# Patient Record
Sex: FEMALE | Race: WHITE | NOT HISPANIC OR LATINO | Employment: OTHER | URBAN - NONMETROPOLITAN AREA
[De-identification: names, ages, dates, MRNs, and addresses within clinical notes are randomized per-mention and may not be internally consistent; named-entity substitution may affect disease eponyms.]

---

## 2022-09-08 ENCOUNTER — APPOINTMENT (EMERGENCY)
Dept: CT IMAGING | Facility: HOSPITAL | Age: 77
DRG: 440 | End: 2022-09-08
Payer: MEDICARE

## 2022-09-08 ENCOUNTER — HOSPITAL ENCOUNTER (INPATIENT)
Facility: HOSPITAL | Age: 77
LOS: 2 days | Discharge: HOME | DRG: 440 | End: 2022-09-11
Attending: EMERGENCY MEDICINE | Admitting: FAMILY MEDICINE
Payer: MEDICARE

## 2022-09-08 DIAGNOSIS — K85.90 PANCREATITIS: ICD-10-CM

## 2022-09-08 DIAGNOSIS — R10.9 ABDOMINAL PAIN: Primary | ICD-10-CM

## 2022-09-08 LAB
ALBUMIN SERPL BCP-MCNC: 3.8 G/DL (ref 3.5–5)
ALP SERPL-CCNC: 544 U/L (ref 46–116)
ALT SERPL W P-5'-P-CCNC: 263 U/L (ref 12–78)
ANION GAP SERPL CALCULATED.3IONS-SCNC: 9 MMOL/L (ref 4–13)
AST SERPL W P-5'-P-CCNC: 440 U/L (ref 5–45)
BASOPHILS # BLD AUTO: 0.04 THOUSANDS/ΜL (ref 0–0.1)
BASOPHILS NFR BLD AUTO: 1 % (ref 0–1)
BILIRUB SERPL-MCNC: 2.22 MG/DL (ref 0.2–1)
BUN SERPL-MCNC: 25 MG/DL (ref 5–25)
CALCIUM SERPL-MCNC: 9 MG/DL (ref 8.3–10.1)
CHLORIDE SERPL-SCNC: 101 MMOL/L (ref 96–108)
CO2 SERPL-SCNC: 26 MMOL/L (ref 21–32)
CREAT SERPL-MCNC: 1.31 MG/DL (ref 0.6–1.3)
EOSINOPHIL # BLD AUTO: 0.22 THOUSAND/ΜL (ref 0–0.61)
EOSINOPHIL NFR BLD AUTO: 3 % (ref 0–6)
ERYTHROCYTE [DISTWIDTH] IN BLOOD BY AUTOMATED COUNT: 13.8 % (ref 11.6–15.1)
GFR SERPL CREATININE-BSD FRML MDRD: 39 ML/MIN/1.73SQ M
GLUCOSE SERPL-MCNC: 119 MG/DL (ref 65–140)
HCT VFR BLD AUTO: 38.6 % (ref 34.8–46.1)
HGB BLD-MCNC: 12.6 G/DL (ref 11.5–15.4)
IMM GRANULOCYTES # BLD AUTO: 0.03 THOUSAND/UL (ref 0–0.2)
IMM GRANULOCYTES NFR BLD AUTO: 0 % (ref 0–2)
LIPASE SERPL-CCNC: 5360 U/L (ref 73–393)
LYMPHOCYTES # BLD AUTO: 0.65 THOUSANDS/ΜL (ref 0.6–4.47)
LYMPHOCYTES NFR BLD AUTO: 8 % (ref 14–44)
MCH RBC QN AUTO: 29.4 PG (ref 26.8–34.3)
MCHC RBC AUTO-ENTMCNC: 32.6 G/DL (ref 31.4–37.4)
MCV RBC AUTO: 90 FL (ref 82–98)
MONOCYTES # BLD AUTO: 0.59 THOUSAND/ΜL (ref 0.17–1.22)
MONOCYTES NFR BLD AUTO: 8 % (ref 4–12)
NEUTROPHILS # BLD AUTO: 6.24 THOUSANDS/ΜL (ref 1.85–7.62)
NEUTS SEG NFR BLD AUTO: 80 % (ref 43–75)
NRBC BLD AUTO-RTO: 0 /100 WBCS
PLATELET # BLD AUTO: 341 THOUSANDS/UL (ref 149–390)
PMV BLD AUTO: 10 FL (ref 8.9–12.7)
POTASSIUM SERPL-SCNC: 4.6 MMOL/L (ref 3.5–5.3)
PROT SERPL-MCNC: 7.2 G/DL (ref 6.4–8.4)
RBC # BLD AUTO: 4.29 MILLION/UL (ref 3.81–5.12)
SODIUM SERPL-SCNC: 136 MMOL/L (ref 135–147)
WBC # BLD AUTO: 7.77 THOUSAND/UL (ref 4.31–10.16)

## 2022-09-08 PROCEDURE — 85025 COMPLETE CBC W/AUTO DIFF WBC: CPT | Performed by: EMERGENCY MEDICINE

## 2022-09-08 PROCEDURE — C9113 INJ PANTOPRAZOLE SODIUM, VIA: HCPCS | Performed by: EMERGENCY MEDICINE

## 2022-09-08 PROCEDURE — 84481 FREE ASSAY (FT-3): CPT | Performed by: FAMILY MEDICINE

## 2022-09-08 PROCEDURE — 99285 EMERGENCY DEPT VISIT HI MDM: CPT | Performed by: EMERGENCY MEDICINE

## 2022-09-08 PROCEDURE — 83690 ASSAY OF LIPASE: CPT | Performed by: EMERGENCY MEDICINE

## 2022-09-08 PROCEDURE — 96375 TX/PRO/DX INJ NEW DRUG ADDON: CPT

## 2022-09-08 PROCEDURE — 96374 THER/PROPH/DIAG INJ IV PUSH: CPT

## 2022-09-08 PROCEDURE — 74177 CT ABD & PELVIS W/CONTRAST: CPT

## 2022-09-08 PROCEDURE — 99285 EMERGENCY DEPT VISIT HI MDM: CPT

## 2022-09-08 PROCEDURE — 80053 COMPREHEN METABOLIC PANEL: CPT | Performed by: EMERGENCY MEDICINE

## 2022-09-08 PROCEDURE — 36415 COLL VENOUS BLD VENIPUNCTURE: CPT | Performed by: EMERGENCY MEDICINE

## 2022-09-08 PROCEDURE — G1004 CDSM NDSC: HCPCS

## 2022-09-08 PROCEDURE — 1124F ACP DISCUSS-NO DSCNMKR DOCD: CPT | Performed by: EMERGENCY MEDICINE

## 2022-09-08 RX ORDER — ONDANSETRON 2 MG/ML
4 INJECTION INTRAMUSCULAR; INTRAVENOUS ONCE
Status: COMPLETED | OUTPATIENT
Start: 2022-09-08 | End: 2022-09-08

## 2022-09-08 RX ORDER — PANTOPRAZOLE SODIUM 40 MG/10ML
40 INJECTION, POWDER, LYOPHILIZED, FOR SOLUTION INTRAVENOUS ONCE
Status: COMPLETED | OUTPATIENT
Start: 2022-09-08 | End: 2022-09-08

## 2022-09-08 RX ADMIN — MORPHINE SULFATE 2 MG: 2 INJECTION, SOLUTION INTRAMUSCULAR; INTRAVENOUS at 21:16

## 2022-09-08 RX ADMIN — IOHEXOL 85 ML: 350 INJECTION, SOLUTION INTRAVENOUS at 22:03

## 2022-09-08 RX ADMIN — ONDANSETRON 4 MG: 2 INJECTION INTRAMUSCULAR; INTRAVENOUS at 21:12

## 2022-09-08 RX ADMIN — PANTOPRAZOLE SODIUM 40 MG: 40 INJECTION, POWDER, FOR SOLUTION INTRAVENOUS at 21:12

## 2022-09-09 ENCOUNTER — APPOINTMENT (OUTPATIENT)
Dept: MRI IMAGING | Facility: HOSPITAL | Age: 77
DRG: 440 | End: 2022-09-09
Payer: MEDICARE

## 2022-09-09 PROBLEM — K85.90 ACUTE PANCREATITIS: Status: ACTIVE | Noted: 2022-09-09

## 2022-09-09 PROBLEM — K31.6 ACQUIRED GASTRIC FISTULA: Status: ACTIVE | Noted: 2022-09-09

## 2022-09-09 PROBLEM — I10 PRIMARY HYPERTENSION: Status: ACTIVE | Noted: 2022-09-09

## 2022-09-09 PROBLEM — G89.4 CHRONIC PAIN SYNDROME: Status: ACTIVE | Noted: 2022-09-09

## 2022-09-09 PROBLEM — N18.30 CKD (CHRONIC KIDNEY DISEASE) STAGE 3, GFR 30-59 ML/MIN (HCC): Status: ACTIVE | Noted: 2022-09-09

## 2022-09-09 PROBLEM — E03.9 ACQUIRED HYPOTHYROIDISM: Status: ACTIVE | Noted: 2022-09-09

## 2022-09-09 LAB
ALBUMIN SERPL BCP-MCNC: 3.1 G/DL (ref 3.5–5)
ALP SERPL-CCNC: 559 U/L (ref 46–116)
ALT SERPL W P-5'-P-CCNC: 373 U/L (ref 12–78)
ANION GAP SERPL CALCULATED.3IONS-SCNC: 10 MMOL/L (ref 4–13)
AST SERPL W P-5'-P-CCNC: 476 U/L (ref 5–45)
BASOPHILS # BLD AUTO: 0.02 THOUSANDS/ΜL (ref 0–0.1)
BASOPHILS NFR BLD AUTO: 0 % (ref 0–1)
BILIRUB SERPL-MCNC: 3.24 MG/DL (ref 0.2–1)
BUN SERPL-MCNC: 21 MG/DL (ref 5–25)
CALCIUM ALBUM COR SERPL-MCNC: 9.2 MG/DL (ref 8.3–10.1)
CALCIUM SERPL-MCNC: 8.5 MG/DL (ref 8.3–10.1)
CHLORIDE SERPL-SCNC: 102 MMOL/L (ref 96–108)
CO2 SERPL-SCNC: 25 MMOL/L (ref 21–32)
CREAT SERPL-MCNC: 1.39 MG/DL (ref 0.6–1.3)
EOSINOPHIL # BLD AUTO: 0.07 THOUSAND/ΜL (ref 0–0.61)
EOSINOPHIL NFR BLD AUTO: 1 % (ref 0–6)
ERYTHROCYTE [DISTWIDTH] IN BLOOD BY AUTOMATED COUNT: 13.9 % (ref 11.6–15.1)
GFR SERPL CREATININE-BSD FRML MDRD: 36 ML/MIN/1.73SQ M
GLUCOSE SERPL-MCNC: 128 MG/DL (ref 65–140)
HCT VFR BLD AUTO: 33.9 % (ref 34.8–46.1)
HGB BLD-MCNC: 11.1 G/DL (ref 11.5–15.4)
IMM GRANULOCYTES # BLD AUTO: 0.02 THOUSAND/UL (ref 0–0.2)
IMM GRANULOCYTES NFR BLD AUTO: 0 % (ref 0–2)
INR PPP: 0.99 (ref 0.84–1.19)
LIPASE SERPL-CCNC: 1791 U/L (ref 73–393)
LYMPHOCYTES # BLD AUTO: 0.28 THOUSANDS/ΜL (ref 0.6–4.47)
LYMPHOCYTES NFR BLD AUTO: 4 % (ref 14–44)
MAGNESIUM SERPL-MCNC: 2.1 MG/DL (ref 1.6–2.6)
MCH RBC QN AUTO: 29.2 PG (ref 26.8–34.3)
MCHC RBC AUTO-ENTMCNC: 32.7 G/DL (ref 31.4–37.4)
MCV RBC AUTO: 89 FL (ref 82–98)
MONOCYTES # BLD AUTO: 0.6 THOUSAND/ΜL (ref 0.17–1.22)
MONOCYTES NFR BLD AUTO: 8 % (ref 4–12)
NEUTROPHILS # BLD AUTO: 6.33 THOUSANDS/ΜL (ref 1.85–7.62)
NEUTS SEG NFR BLD AUTO: 87 % (ref 43–75)
NRBC BLD AUTO-RTO: 0 /100 WBCS
PHOSPHATE SERPL-MCNC: 3.7 MG/DL (ref 2.3–4.1)
PLATELET # BLD AUTO: 280 THOUSANDS/UL (ref 149–390)
PMV BLD AUTO: 9.8 FL (ref 8.9–12.7)
POTASSIUM SERPL-SCNC: 4.1 MMOL/L (ref 3.5–5.3)
PROT SERPL-MCNC: 6.1 G/DL (ref 6.4–8.4)
PROTHROMBIN TIME: 13.2 SECONDS (ref 11.6–14.5)
RBC # BLD AUTO: 3.8 MILLION/UL (ref 3.81–5.12)
SODIUM SERPL-SCNC: 137 MMOL/L (ref 135–147)
T3FREE SERPL-MCNC: 2.66 PG/ML (ref 2.3–4.2)
T4 FREE SERPL-MCNC: 1.55 NG/DL (ref 0.76–1.46)
TRIGL SERPL-MCNC: 45 MG/DL
TSH SERPL DL<=0.05 MIU/L-ACNC: 0.3 UIU/ML (ref 0.45–4.5)
WBC # BLD AUTO: 7.32 THOUSAND/UL (ref 4.31–10.16)

## 2022-09-09 PROCEDURE — 84100 ASSAY OF PHOSPHORUS: CPT | Performed by: NURSE PRACTITIONER

## 2022-09-09 PROCEDURE — 99222 1ST HOSP IP/OBS MODERATE 55: CPT | Performed by: FAMILY MEDICINE

## 2022-09-09 PROCEDURE — NC001 PR NO CHARGE

## 2022-09-09 PROCEDURE — 84478 ASSAY OF TRIGLYCERIDES: CPT | Performed by: NURSE PRACTITIONER

## 2022-09-09 PROCEDURE — 80053 COMPREHEN METABOLIC PANEL: CPT | Performed by: NURSE PRACTITIONER

## 2022-09-09 PROCEDURE — 84439 ASSAY OF FREE THYROXINE: CPT | Performed by: NURSE PRACTITIONER

## 2022-09-09 PROCEDURE — 83690 ASSAY OF LIPASE: CPT | Performed by: NURSE PRACTITIONER

## 2022-09-09 PROCEDURE — G1004 CDSM NDSC: HCPCS

## 2022-09-09 PROCEDURE — 85610 PROTHROMBIN TIME: CPT | Performed by: NURSE PRACTITIONER

## 2022-09-09 PROCEDURE — 84443 ASSAY THYROID STIM HORMONE: CPT | Performed by: NURSE PRACTITIONER

## 2022-09-09 PROCEDURE — 83735 ASSAY OF MAGNESIUM: CPT | Performed by: NURSE PRACTITIONER

## 2022-09-09 PROCEDURE — 85025 COMPLETE CBC W/AUTO DIFF WBC: CPT | Performed by: NURSE PRACTITIONER

## 2022-09-09 PROCEDURE — 74181 MRI ABDOMEN W/O CONTRAST: CPT

## 2022-09-09 RX ORDER — LEVOTHYROXINE SODIUM 0.07 MG/1
75 TABLET ORAL DAILY
COMMUNITY

## 2022-09-09 RX ORDER — SODIUM CHLORIDE 9 MG/ML
75 INJECTION, SOLUTION INTRAVENOUS CONTINUOUS
Status: DISCONTINUED | OUTPATIENT
Start: 2022-09-09 | End: 2022-09-11

## 2022-09-09 RX ORDER — HYDROMORPHONE HCL/PF 1 MG/ML
1 SYRINGE (ML) INJECTION EVERY 4 HOURS PRN
Status: DISCONTINUED | OUTPATIENT
Start: 2022-09-09 | End: 2022-09-10

## 2022-09-09 RX ORDER — HYDROMORPHONE HCL/PF 1 MG/ML
0.5 SYRINGE (ML) INJECTION ONCE
Status: COMPLETED | OUTPATIENT
Start: 2022-09-09 | End: 2022-09-09

## 2022-09-09 RX ORDER — IBUPROFEN 200 MG
200 TABLET ORAL EVERY 6 HOURS PRN
Status: DISCONTINUED | OUTPATIENT
Start: 2022-09-09 | End: 2022-09-11 | Stop reason: HOSPADM

## 2022-09-09 RX ORDER — ESCITALOPRAM OXALATE 20 MG/1
20 TABLET ORAL DAILY
COMMUNITY

## 2022-09-09 RX ORDER — PANTOPRAZOLE SODIUM 20 MG/1
20 TABLET, DELAYED RELEASE ORAL 2 TIMES DAILY
Status: DISCONTINUED | OUTPATIENT
Start: 2022-09-09 | End: 2022-09-10

## 2022-09-09 RX ORDER — HYDROCODONE BITARTRATE AND ACETAMINOPHEN 5; 325 MG/1; MG/1
1 TABLET ORAL 2 TIMES DAILY
COMMUNITY

## 2022-09-09 RX ORDER — NICOTINE POLACRILEX 2 MG
GUM BUCCAL
COMMUNITY

## 2022-09-09 RX ORDER — ERGOCALCIFEROL (VITAMIN D2) 1250 MCG
50000 CAPSULE ORAL WEEKLY
COMMUNITY

## 2022-09-09 RX ORDER — KETOROLAC TROMETHAMINE 30 MG/ML
15 INJECTION, SOLUTION INTRAMUSCULAR; INTRAVENOUS ONCE
Status: COMPLETED | OUTPATIENT
Start: 2022-09-09 | End: 2022-09-09

## 2022-09-09 RX ORDER — HEPARIN SODIUM 5000 [USP'U]/ML
5000 INJECTION, SOLUTION INTRAVENOUS; SUBCUTANEOUS EVERY 8 HOURS SCHEDULED
Status: DISCONTINUED | OUTPATIENT
Start: 2022-09-09 | End: 2022-09-11 | Stop reason: HOSPADM

## 2022-09-09 RX ORDER — ESCITALOPRAM OXALATE 10 MG/1
20 TABLET ORAL DAILY
Status: DISCONTINUED | OUTPATIENT
Start: 2022-09-10 | End: 2022-09-11 | Stop reason: HOSPADM

## 2022-09-09 RX ORDER — LEVOTHYROXINE SODIUM 0.07 MG/1
75 TABLET ORAL DAILY
Status: DISCONTINUED | OUTPATIENT
Start: 2022-09-09 | End: 2022-09-10

## 2022-09-09 RX ORDER — LABETALOL HYDROCHLORIDE 5 MG/ML
10 INJECTION, SOLUTION INTRAVENOUS EVERY 6 HOURS PRN
Status: DISCONTINUED | OUTPATIENT
Start: 2022-09-09 | End: 2022-09-10

## 2022-09-09 RX ORDER — PANTOPRAZOLE SODIUM 40 MG/10ML
40 INJECTION, POWDER, LYOPHILIZED, FOR SOLUTION INTRAVENOUS
Status: DISCONTINUED | OUTPATIENT
Start: 2022-09-10 | End: 2022-09-11 | Stop reason: HOSPADM

## 2022-09-09 RX ORDER — HYDROMORPHONE HCL/PF 1 MG/ML
0.5 SYRINGE (ML) INJECTION
Status: DISCONTINUED | OUTPATIENT
Start: 2022-09-09 | End: 2022-09-11 | Stop reason: HOSPADM

## 2022-09-09 RX ORDER — MELATONIN
2200 WEEKLY
Status: ON HOLD | COMMUNITY
End: 2022-09-09 | Stop reason: CLARIF

## 2022-09-09 RX ORDER — PANTOPRAZOLE SODIUM 40 MG/10ML
40 INJECTION, POWDER, LYOPHILIZED, FOR SOLUTION INTRAVENOUS
Status: DISCONTINUED | OUTPATIENT
Start: 2022-09-09 | End: 2022-09-09

## 2022-09-09 RX ORDER — AMLODIPINE BESYLATE 5 MG/1
5 TABLET ORAL DAILY
Status: DISCONTINUED | OUTPATIENT
Start: 2022-09-09 | End: 2022-09-11 | Stop reason: HOSPADM

## 2022-09-09 RX ORDER — ONDANSETRON 2 MG/ML
4 INJECTION INTRAMUSCULAR; INTRAVENOUS EVERY 6 HOURS PRN
Status: DISCONTINUED | OUTPATIENT
Start: 2022-09-09 | End: 2022-09-11 | Stop reason: HOSPADM

## 2022-09-09 RX ADMIN — PIPERACILLIN AND TAZOBACTAM 2.25 G: 2; .25 INJECTION, POWDER, LYOPHILIZED, FOR SOLUTION INTRAVENOUS at 01:23

## 2022-09-09 RX ADMIN — SODIUM CHLORIDE 125 ML/HR: 0.9 INJECTION, SOLUTION INTRAVENOUS at 10:19

## 2022-09-09 RX ADMIN — SODIUM CHLORIDE 125 ML/HR: 0.9 INJECTION, SOLUTION INTRAVENOUS at 23:09

## 2022-09-09 RX ADMIN — HYDROMORPHONE HYDROCHLORIDE 0.5 MG: 1 INJECTION, SOLUTION INTRAMUSCULAR; INTRAVENOUS; SUBCUTANEOUS at 01:07

## 2022-09-09 RX ADMIN — HYDROMORPHONE HYDROCHLORIDE 0.5 MG: 1 INJECTION, SOLUTION INTRAMUSCULAR; INTRAVENOUS; SUBCUTANEOUS at 08:27

## 2022-09-09 RX ADMIN — IBUPROFEN 200 MG: 200 TABLET, FILM COATED ORAL at 11:28

## 2022-09-09 RX ADMIN — PANTOPRAZOLE SODIUM 20 MG: 20 TABLET, DELAYED RELEASE ORAL at 20:46

## 2022-09-09 RX ADMIN — PIPERACILLIN AND TAZOBACTAM 2.25 G: 2; .25 INJECTION, POWDER, LYOPHILIZED, FOR SOLUTION INTRAVENOUS at 14:54

## 2022-09-09 RX ADMIN — HYDROMORPHONE HYDROCHLORIDE 0.5 MG: 1 INJECTION, SOLUTION INTRAMUSCULAR; INTRAVENOUS; SUBCUTANEOUS at 18:58

## 2022-09-09 RX ADMIN — PIPERACILLIN AND TAZOBACTAM 2.25 G: 2; .25 INJECTION, POWDER, LYOPHILIZED, FOR SOLUTION INTRAVENOUS at 09:15

## 2022-09-09 RX ADMIN — KETOROLAC TROMETHAMINE 15 MG: 30 INJECTION, SOLUTION INTRAMUSCULAR at 23:40

## 2022-09-09 RX ADMIN — SODIUM CHLORIDE 125 ML/HR: 0.9 INJECTION, SOLUTION INTRAVENOUS at 01:22

## 2022-09-09 RX ADMIN — HEPARIN SODIUM 5000 UNITS: 5000 INJECTION INTRAVENOUS; SUBCUTANEOUS at 20:47

## 2022-09-09 RX ADMIN — HEPARIN SODIUM 5000 UNITS: 5000 INJECTION INTRAVENOUS; SUBCUTANEOUS at 14:53

## 2022-09-09 NOTE — ASSESSMENT & PLAN NOTE
· Chronic pain syndrome involving lumbar spine  · Followed by pain management Dr Marquez Robles  · Hydrocodone b i d  P r n   Home regimen- resume when tolerating oral intake  · PDMP reviewed- no red flags

## 2022-09-09 NOTE — H&P
[address] Megan Margarito  H&P- Max Reidgrmateo 1945, 65 y o  female MRN: [id number]  Unit/Bed#: -[facility] Encounter: [id number]  Primary Care Provider: Rocky Hill MD   Date and time admitted to hospital: 9/8/2022  8:47 PM    * Acute pancreatitis  Assessment & Plan  · Developed acute severe abdominal pain at 2 p m   · Transaminase elevation-remote cholecystectomy 1997   · Lipase 5360  · CT abdomen pelvis- Mild distention of intrahepatic bile ducts  CBD measures up to 16 mm  Acute pancreatitis and concern for diffuse pancreatic necrosis  · Check MRCP- rule out CBD obstruction  · Empiric Zosyn  · Check procalcitonin  · Pain control  · NPO  · IV hydration  · Appreciate general surgery consultation- aware after discussion with ED attending    Primary hypertension  Assessment & Plan  · PTA valsartan/HCTZ combo- on hold due to NPO  · P r n  Labetalol  · Trend blood pressures    CKD (chronic kidney disease) stage 3, GFR 30-59 ml/min [facility])  Assessment & Plan  Lab Results   Component Value Date    EGFR 39 09/08/2022    CREATININE 1 31 (H) 09/08/2022   · Creatinine 1 31 on admission appears baseline  · Trend creatinine  · Renally dose medications  · CT- Left renal 2 2 cm cyst   Symmetric nephrographic phase enhancement of the kidneys  No obstructive uropathy  · Outpatient follow-up    Acquired hypothyroidism  Assessment & Plan  · Update TSH with reflex T4  · Resume levothyroxine 75 mcg daily    Hx of Acquired gastric fistula  Assessment & Plan  · s/p ORYGBP with known gastric-gastric fistula diagnosed 2013  · [facility]  · Continue PPI      Chronic pain syndrome  Assessment & Plan  · Chronic pain syndrome involving lumbar spine  · Followed by pain management Dr Iris Titus  · Hydrocodone b i d  P r n   Home regimen- resume when tolerating oral intake  · PDMP reviewed- no red flags    VTE Pharmacologic Prophylaxis: VTE Score: 6 High Risk (Score >/= 5) - Pharmacological DVT Prophylaxis Ordered: heparin  Sequential Compression Devices Ordered  Code Status: Level 1 - Full Code   Discussion with family: Updated  (daughter) at bedside  Anticipated Length of Stay: Patient will be admitted on an inpatient basis with an anticipated length of stay of greater than 2 midnights secondary to pancreatitis  Total Time for Visit, including Counseling / Coordination of Care: 30 minutes Greater than 50% of this total time spent on direct patient counseling and coordination of care  Chief Complaint:  Severe abdominal pain    History of Present Illness:  Nilsa Bundy is a 65 y o  female with a PMH of gastric bypass surgery with cholecystectomy 1997, 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 maintained on PPI, hypertension, hypothyroidism, chronic pain syndrome who presents with acute onset of severe epigastric abdominal pain that radiated straight through to her back and surrounded her abdomen in a bandlike fashion earlier this afternoon  Not associated with nausea/vomiting/diarrhea  In the emergency department found to have acute pancreatitis with concern for necrotizing pancreatitis and CBD dilation  Patient was discussed by ER attending with general surgeon on-call, recommendations were made for admission to the medical service for MRCP  Review of Systems:  Review of Systems   Constitutional: Positive for chills  Negative for fever  HENT: Negative for ear pain and sore throat  Eyes: Negative for pain and visual disturbance  Respiratory: Negative for cough and shortness of breath  Cardiovascular: Negative for chest pain and palpitations  Gastrointestinal: Positive for abdominal pain  Negative for vomiting  Genitourinary: Negative for dysuria and hematuria  Musculoskeletal: Negative for arthralgias and back pain  Skin: Negative for color change and rash  Neurological: Negative for seizures and syncope  All other systems reviewed and are negative        Past Medical and Surgical History:   Past Medical History:   Diagnosis Date    Disease of thyroid gland     GERD (gastroesophageal reflux disease)     Hypertension        Past Surgical History:   Procedure Laterality Date    HYSTERECTOMY      REDUCTION MAMMAPLASTY Bilateral     KAREEM-EN-Y PROCEDURE      open bypass and jeff Dr Tanya Wilkinson         Meds/Allergies:  Prior to Admission medications    Medication Sig Start Date End Date Taking? Authorizing Provider   Biotin 1 MG CAPS Take by mouth   Yes Historical Provider, MD   cholecalciferol (VITAMIN D3) 1,000 units tablet Take 2,200 Units by mouth once a week wednesday   Yes Historical Provider, MD   escitalopram (LEXAPRO) 20 mg tablet Take 20 mg by mouth daily   Yes Historical Provider, MD   HYDROcodone-acetaminophen (NORCO) 5-325 mg per tablet Take 1 tablet by mouth 2 (two) times a day   Yes Historical Provider, MD   levothyroxine 75 mcg tablet Take 75 mcg by mouth daily Unsure of the correct dosage   Yes Historical Provider, MD   Pantoprazole Sodium (PROTONIX PO) Take 20 mg by mouth 2 (two) times a day   Yes Historical Provider, MD   valsartan 160 mg TABS 160 mg, hydrochlorothiazide 12 5 mg TABS 12 5 mg Take by mouth daily   Yes Historical Provider, MD     I have reviewed home medications with patient personally      Allergies: No Known Allergies    Social History:  Marital Status: /Civil Union   Occupation:  Retired  Patient Pre-hospital Living Situation: Home  Patient Pre-hospital Level of Mobility: walks  Patient Pre-hospital Diet Restrictions:  None  Substance Use History:   Social History     Substance and Sexual Activity   Alcohol Use Not Currently     Social History     Tobacco Use   Smoking Status Never Smoker   Smokeless Tobacco Never Used     Social History     Substance and Sexual Activity   Drug Use Never       Family History:  Family History   Problem Relation Age of Onset    Hypertension Mother     Heart disease Mother     COPD Mother  Deep vein thrombosis Mother        Physical Exam:     Vitals:   Blood Pressure: (!) 186/99 (09/09/22 0041)  Pulse: 81 (09/09/22 0041)  Temperature: 97 9 °F (36 6 °C) (09/09/22 0039)  Temp Source: Temporal (09/08/22 2051)  Respirations: 18 (09/09/22 0041)  Height: 5' 3" (160 cm) (09/08/22 2051)  Weight - Scale: 93 1 kg (205 lb 4 oz) (09/09/22 0049)  SpO2: 98 % (09/09/22 0041)    Physical Exam  Vitals and nursing note reviewed  Constitutional:       General: She is not in acute distress  Appearance: She is well-developed  She is ill-appearing  HENT:      Head: Normocephalic and atraumatic  Mouth/Throat:      Mouth: Mucous membranes are dry  Eyes:      Conjunctiva/sclera: Conjunctivae normal    Cardiovascular:      Rate and Rhythm: Normal rate and regular rhythm  Heart sounds: No murmur heard  Pulmonary:      Effort: Pulmonary effort is normal  No respiratory distress  Breath sounds: Normal breath sounds  Abdominal:      Palpations: Abdomen is soft  Tenderness: There is abdominal tenderness in the epigastric area  There is guarding  Musculoskeletal:         General: No swelling  Normal range of motion  Cervical back: Neck supple  Skin:     General: Skin is warm and dry  Capillary Refill: Capillary refill takes less than 2 seconds  Neurological:      General: No focal deficit present  Mental Status: She is alert and oriented to person, place, and time  Cranial Nerves: No cranial nerve deficit     Psychiatric:         Mood and Affect: Mood normal          Behavior: Behavior normal           Additional Data:     Lab Results:  Results from last 7 days   Lab Units 09/08/22 2112   WBC Thousand/uL 7 77   HEMOGLOBIN g/dL 12 6   HEMATOCRIT % 38 6   PLATELETS Thousands/uL 341   NEUTROS PCT % 80*   LYMPHS PCT % 8*   MONOS PCT % 8   EOS PCT % 3     Results from last 7 days   Lab Units 09/08/22 2112   SODIUM mmol/L 136   POTASSIUM mmol/L 4 6   CHLORIDE mmol/L 101   CO2 mmol/L 26   BUN mg/dL 25   CREATININE mg/dL 1 31*   ANION GAP mmol/L 9   CALCIUM mg/dL 9 0   ALBUMIN g/dL 3 8   TOTAL BILIRUBIN mg/dL 2 22*   ALK PHOS U/L 544*   ALT U/L 263*   AST U/L 440*   GLUCOSE RANDOM mg/dL 119                       Imaging: Reviewed radiology reports from this admission including: abdominal/pelvic CT  CT abdomen pelvis with contrast   Final Result by Jeffrey Drew MD (09/09 0022)         1  Findings consistent with acute pancreatitis and concern for diffuse pancreatic necrosis  2   Dilatation of intrahepatic and common bile ducts, and excess of expected postsurgical change from cholecystectomy, possibly secondary to acute pancreatitis  MRCP may be helpful for further evaluation  Workstation performed: [id number]         MRI inpatient order    (Results Pending)       EKG and Other Studies Reviewed on Admission:   · All    ** Please Note: This note has been constructed using a voice recognition system   **

## 2022-09-09 NOTE — ASSESSMENT & PLAN NOTE
Lab Results   Component Value Date    EGFR 39 09/08/2022    CREATININE 1 31 (H) 09/08/2022   · Creatinine 1 31 on admission appears baseline  · Trend creatinine  · Renally dose medications  · CT- Left renal 2 2 cm cyst   Symmetric nephrographic phase enhancement of the kidneys  No obstructive uropathy    · Outpatient follow-up

## 2022-09-09 NOTE — ED PROVIDER NOTES
History  Chief Complaint   Patient presents with    Abdominal Pain     Upper abdominal pain started approx 2pm, Reports mild nausea  Denies V/D     16-year-old female with a history of gastric bypass, hysterectomy, cholecystectomy presents emergency room with a chief complaint of abdominal pain which began around 11 30 this morning  Patient reports that she was preparing to go to lunch with friends and she took her normal narcotic pain medication, however, she took this on an empty stomach which is not her norm  Patient reports that she has a history of gastroesophageal reflux disease  She reports that the pain she is experiencing a somewhat worse  It she reports that it radiates up across her abdomen  Caused her feel slightly nauseated no vomiting  No diarrhea  No urinary complaints  Patient did tolerate p o  Intake earlier today  History provided by:  Patient  Abdominal Pain  Pain location:  Epigastric  Pain quality: sharp    Pain radiates to:  RUQ and LUQ  Pain severity:  Moderate  Onset quality:  Gradual  Timing:  Intermittent  Progression:  Waxing and waning  Chronicity:  New  Context: previous surgery    Context: not alcohol use    Relieved by:  Nothing  Worsened by:  Palpation  Ineffective treatments:  Antacids  Associated symptoms: nausea    Associated symptoms: no chest pain, no constipation, no cough, no diarrhea, no dysuria, no fatigue, no fever, no shortness of breath, no sore throat and no vomiting    Risk factors: being elderly and multiple surgeries    Risk factors: no alcohol abuse        None       Past Medical History:   Diagnosis Date    Disease of thyroid gland     GERD (gastroesophageal reflux disease)     Hypertension        Past Surgical History:   Procedure Laterality Date    GASTRECTOMY      bypass in [facility] Bilateral        History reviewed  No pertinent family history    I have reviewed and agree with the history as documented  E-Cigarette/Vaping     E-Cigarette/Vaping Substances     Social History     Tobacco Use    Smoking status: Never Smoker    Smokeless tobacco: Never Used   Substance Use Topics    Alcohol use: Not Currently    Drug use: Never       Review of Systems   Constitutional: Negative  Negative for activity change, diaphoresis, fatigue and fever  HENT: Negative  Negative for congestion, ear pain, rhinorrhea, sinus pressure and sore throat  Eyes: Negative  Respiratory: Negative  Negative for cough, choking, chest tightness, shortness of breath and wheezing  Cardiovascular: Negative  Negative for chest pain, palpitations and leg swelling  Gastrointestinal: Positive for abdominal pain and nausea  Negative for constipation, diarrhea and vomiting  Endocrine: Negative  Genitourinary: Negative  Negative for dysuria, flank pain and frequency  Musculoskeletal: Negative  Negative for arthralgias, back pain and myalgias  Skin: Negative  Negative for rash  Allergic/Immunologic: Negative  Neurological: Negative  Negative for dizziness, weakness, light-headedness and headaches  Hematological: Negative  Psychiatric/Behavioral: Negative  All other systems reviewed and are negative  Physical Exam  Physical Exam  Vitals and nursing note reviewed  Constitutional:       General: She is awake  She is not in acute distress  Appearance: Normal appearance  She is well-developed  She is obese  She is not ill-appearing or toxic-appearing  HENT:      Head: Normocephalic and atraumatic  Right Ear: External ear normal       Left Ear: External ear normal       Nose: Nose normal       Mouth/Throat:      Mouth: Mucous membranes are moist    Eyes:      General: Lids are normal  No scleral icterus  Extraocular Movements: Extraocular movements intact  Pupils: Pupils are equal, round, and reactive to light     Cardiovascular:      Rate and Rhythm: Normal rate and regular rhythm  Heart sounds: Normal heart sounds  No murmur heard  Pulmonary:      Effort: Pulmonary effort is normal  No respiratory distress  Breath sounds: Normal breath sounds  No wheezing, rhonchi or rales  Abdominal:      General: Abdomen is flat  There is no distension  Palpations: Abdomen is soft  Tenderness: There is abdominal tenderness in the epigastric area  There is no guarding or rebound  Musculoskeletal:         General: No swelling, tenderness or deformity  Normal range of motion  Cervical back: Normal range of motion and neck supple  Skin:     General: Skin is warm and dry  Coloration: Skin is not jaundiced or pale  Findings: No rash  Neurological:      Mental Status: She is alert and oriented to person, place, and time  Mental status is at baseline  Cranial Nerves: No cranial nerve deficit  Motor: No weakness     Psychiatric:         Attention and Perception: Attention normal          Mood and Affect: Mood normal          Speech: Speech normal          Behavior: Behavior normal          Vital Signs  ED Triage Vitals [09/08/22 2051]   Temperature Pulse Respirations Blood Pressure SpO2   97 6 °F (36 4 °C) 70 19 151/66 100 %      Temp Source Heart Rate Source Patient Position - Orthostatic VS BP Location FiO2 (%)   Temporal Right;Radial Lying Right arm --      Pain Score       10 - Worst Possible Pain           Vitals:    09/08/22 2130 09/08/22 2230 09/08/22 2300 09/08/22 2330   BP: 122/60 132/58 145/67 150/67   Pulse: 64 71 70 74   Patient Position - Orthostatic VS:  Lying Lying          Visual Acuity      ED Medications  Medications   ondansetron (ZOFRAN) injection 4 mg (4 mg Intravenous Given 9/8/22 2112)   pantoprazole (PROTONIX) injection 40 mg (40 mg Intravenous Given 9/8/22 2112)   morphine injection 2 mg (2 mg Intravenous Given 9/8/22 2116)   iohexol (OMNIPAQUE) 350 MG/ML injection (MULTI-DOSE) 85 mL (85 mL Intravenous Given 9/8/22 2203) Diagnostic Studies  Results Reviewed     Procedure Component Value Units Date/Time    Lipase [[id number]]  (Abnormal) Collected: 09/08/22 2112    Lab Status: Final result Specimen: Blood from Arm, Left Updated: 09/08/22 2156     Lipase 5,360 u/L     Comprehensive metabolic panel [[id number]]  (Abnormal) Collected: 09/08/22 2112    Lab Status: Final result Specimen: Blood from Arm, Left Updated: 09/08/22 2142     Sodium 136 mmol/L      Potassium 4 6 mmol/L      Chloride 101 mmol/L      CO2 26 mmol/L      ANION GAP 9 mmol/L      BUN 25 mg/dL      Creatinine 1 31 mg/dL      Glucose 119 mg/dL      Calcium 9 0 mg/dL       U/L       U/L      Alkaline Phosphatase 544 U/L      Total Protein 7 2 g/dL      Albumin 3 8 g/dL      Total Bilirubin 2 22 mg/dL      eGFR 39 ml/min/1 73sq m     Narrative:      [place] guidelines for Chronic Kidney Disease (CKD):     Stage 1 with normal or high GFR (GFR > 90 mL/min/1 73 square meters)    Stage 2 Mild CKD (GFR = 60-89 mL/min/1 73 square meters)    Stage 3A Moderate CKD (GFR = 45-59 mL/min/1 73 square meters)    Stage 3B Moderate CKD (GFR = 30-44 mL/min/1 73 square meters)    Stage 4 Severe CKD (GFR = 15-29 mL/min/1 73 square meters)    Stage 5 End Stage CKD (GFR <15 mL/min/1 73 square meters)  Note: GFR calculation is accurate only with a steady state creatinine    CBC and differential [[id number]]  (Abnormal) Collected: 09/08/22 2112    Lab Status: Final result Specimen: Blood from Arm, Left Updated: 09/08/22 2123     WBC 7 77 Thousand/uL      RBC 4 29 Million/uL      Hemoglobin 12 6 g/dL      Hematocrit 38 6 %      MCV 90 fL      MCH 29 4 pg      MCHC 32 6 g/dL      RDW 13 8 %      MPV 10 0 fL      Platelets 200 Thousands/uL      nRBC 0 /100 WBCs      Neutrophils Relative 80 %      Immat GRANS % 0 %      Lymphocytes Relative 8 %      Monocytes Relative 8 %      Eosinophils Relative 3 %      Basophils Relative 1 %      Neutrophils Absolute 6 24 Thousands/µL      Immature Grans Absolute 0 03 Thousand/uL      Lymphocytes Absolute 0 65 Thousands/µL      Monocytes Absolute 0 59 Thousand/µL      Eosinophils Absolute 0 22 Thousand/µL      Basophils Absolute 0 04 Thousands/µL                  CT abdomen pelvis with contrast    (Results Pending)              Procedures  Procedures         ED Course  ED Course as of 09/09/22 0013   Thu Sep 08, 2022   2114 Patient reporting to nursing that Protonix will not help her and she is requesting narcotic pain medication   2207 Lipase(!): 5,360   2207 AST(!): 440   2207 ALT(!): 263   2207 Alkaline Phosphatase(!): 544   2208 Four months ago liver function studies were normal    2208 Ultrasound performed in May of this year revealed:  "1  Small cystic area in the gallbladder fossa, probably mild dilation of the cystic duct remnant  Correlation with liver function tests is recommended  If there is concern for choledocholithiasis consider further evaluation with MRCP   2  No significant biliary ductal dilation or other abnormality "   8870 CT shows edematous pancreas with pancreatic fat stranding and common bile duct dilatation this is consistent with the pancreatitis  Patient may require MRCP or ERCP  Fri Sep 09, 2022   0010 MRCP can be performed at this facility  Patient may be admitted to our facility  Had previously discussed with surgery  Nothing for them to add from a clinical standpoint at this time  Admission to medicine  SBIRT 20yo+    Flowsheet Row Most Recent Value   SBIRT (23 yo +)    In order to provide better care to our patients, we are screening all of our patients for alcohol and drug use  Would it be okay to ask you these screening questions? Yes Filed at: 09/08/2022 2055   Initial Alcohol Screen: US AUDIT-C     1  How often do you have a drink containing alcohol? 0 Filed at: 09/08/2022 2055   2   How many drinks containing alcohol do you have on a typical day you are drinking? 0 Filed at: 09/08/2022 2055   3b  FEMALE Any Age, or MALE 65+: How often do you have 4 or more drinks on one occassion? 0 Filed at: 09/08/2022 2055   Audit-C Score 0 Filed at: 09/08/2022 2055   SHERRI: How many times in the past year have you    Used an illegal drug or used a prescription medication for non-medical reasons? Never Filed at: 09/08/2022 2055                    MDM  Number of Diagnoses or Management Options  Abdominal pain: new and requires workup  Pancreatitis: new and requires workup  Diagnosis management comments: Patient presented to the emergency department and a MSE was performed  The patient was evaluated and diagnosed with acute pancreatitis  This is a new issue that will require additional planned work-up and treatment in a hospitalized setting  As may have been required as part of this evaluation, clinical laboratory test, radiology imaging and medical testing (I e  EKG) were ordered as necessitated by the patient's presentation  I independently reviewed these studies, imaging and testing  This patient's case is considered to be a considerable risk secondary to the above listed disease process and poses a threat to the patient's well-being and baseline function  Further in-patient diagnostic testing and management, which may include the administration of parenteral medications, is required           Amount and/or Complexity of Data Reviewed  Clinical lab tests: reviewed and ordered  Tests in the radiology section of CPT®: reviewed and ordered  Discuss the patient with other providers: yes (Surgery and Medicine)    Risk of Complications, Morbidity, and/or Mortality  Presenting problems: high  Diagnostic procedures: moderate  Management options: moderate    Patient Progress  Patient progress: improved      Disposition  Final diagnoses:   Abdominal pain   Pancreatitis     Time reflects when diagnosis was documented in both MDM as applicable and the Disposition within this note Time User Action Codes Description Comment    9/9/2022 12:02 AM Eilleen Blades Add [R10 9] Abdominal pain     9/9/2022 12:03 AM Eilleen Blades Add [K85 90] Pancreatitis       ED Disposition     ED Disposition   Admit    Condition   Stable    Date/Time   Fri Sep 9, 2022 12:03 AM    Comment              Follow-up Information    None         Patient's Medications    No medications on file       No discharge procedures on file      PDMP Review     None          ED Provider  Electronically Signed by           Beka Campbell DO  09/09/22 0013

## 2022-09-09 NOTE — ASSESSMENT & PLAN NOTE
· s/p ORYGBP with known gastric-gastric fistula diagnosed 2013  · Followed by bariatric surgery at [facility]  · Continue PPI

## 2022-09-09 NOTE — PLAN OF CARE
Problem: PAIN - ADULT  Goal: Verbalizes/displays adequate comfort level or baseline comfort level  Description: Interventions:  - Encourage patient to monitor pain and request assistance  - Assess pain using appropriate pain scale  - Administer analgesics based on type and severity of pain and evaluate response  - Implement non-pharmacological measures as appropriate and evaluate response  - Consider cultural and social influences on pain and pain management  - Notify physician/advanced practitioner if interventions unsuccessful or patient reports new pain  Outcome: Progressing     Problem: INFECTION - ADULT  Goal: Absence or prevention of progression during hospitalization  Description: INTERVENTIONS:  - Assess and monitor for signs and symptoms of infection  - Monitor lab/diagnostic results  - Monitor all insertion sites, i e  indwelling lines, tubes, and drains  - Monitor endotracheal if appropriate and nasal secretions for changes in amount and color  - Whigham appropriate cooling/warming therapies per order  - Administer medications as ordered  - Instruct and encourage patient and family to use good hand hygiene technique  - Identify and instruct in appropriate isolation precautions for identified infection/condition  Outcome: Progressing  Goal: Absence of fever/infection during neutropenic period  Description: INTERVENTIONS:  - Monitor WBC    Outcome: Progressing

## 2022-09-09 NOTE — CONSULTS
Consultation - [facility] at [facility] 65 y o  female MRN: [id number]  Unit/Bed#: -01 Encounter: [id number]        Consults    Reason for Consult / Principal Problem:     Acute necrotizing pancreatitis  Elevated liver enzymes  Dilated common bile duct          ASSESSMENT AND PLAN:      Acute necrotizing pancreatitis  Elevated liver enzymes  Dilated common bile duct  -patient is clinically stable  -differential diagnosis includes passed stone, pancreatic lesion especially with his family history of pancreatic cancer in 2 second-degree relatives  -possible genetic component given the pancreatic cancers as well as pancreatitis in her daughter  -CT with contrast and MR with evidence of necrotizing pancreatitis without evidence of choledocholithiasis or pancreatic mass  -dilated CBD likely multifactorial including status post cholecystectomy status, chronic opioid use and advanced age  -no indication for antibiotics at this time  -hematocrit and BUN decreasing nicely with IV fluids  -advance to clear liquid diet and if tolerated may advance to low-fat diet tomorrow  -given her RYGB status performing an EUS or ERCP will be more challenging and would require laparoscopy assistance versus an EDGE procedure by advanced GI therefore would recommend repeat MRI/MRCP with and without contrast in 2 months to assess for underlying pancreas lesion  -continue to trend liver enzymes  -check hepatitis panel  -if patient clinically deteriorates or liver enzymes do not decrease appropriately or continue to rise  would recommend transfer to a facility with a have bariatric surgery and advanced GI services        ______________________________________________________________________    HPI:  Lindsay Pickle is a/an 65 y o  female seen in consultation for acute pancreatitis    Patient has significant past history for status post RYGB, status post cholecystectomy, chronic back pain on opioids, history of GB fistula, hypertension, hypothyroidism  Patient was in her usual state of health until yesterday at 12:30 p m  When she developed significant sharp epigastric pain with some mild radiation to the back  This was associated with nausea but no vomiting  Denies fevers  This has never happened to her in the past   She is status post remote RYGB and status post cholecystectomy  Denies smoking, alcohol, NSAID use  No recent travel  No new medications  No herbal supplementations  Lipase 5360  AST 40, , alk-phos 544, T bili 2 22  Liver enzymes normal from 04/2022  CT imaging with mild distention of intrahepatic bile ducts and CBD measuring up to 16 mm with mild hepatomegaly and concern for necrotizing pancreatitis  MRI/MRCP with subcentimeter hepatic cyst, CBD of 11 mm without evidence of choledocholithiasis with mild intrahepatic biliary ductal dilation and again findings compatible with necrotizing pancreatitis  Patient with family history of pancreatic cancer in maternal grandfather and maternal aunt  She states that her daughter had a bout of pancreatitis recently  No sick contacts  REVIEW OF SYSTEMS:    Review of Systems   Constitutional: Negative for fever  HENT: Negative for trouble swallowing  Respiratory: Negative for shortness of breath  Cardiovascular: Negative for chest pain  Gastrointestinal: Positive for abdominal pain, constipation and nausea  Negative for blood in stool, diarrhea and vomiting  Genitourinary: Negative for difficulty urinating  Musculoskeletal: Positive for back pain  Skin: Negative for color change  Allergic/Immunologic: Negative for immunocompromised state  Psychiatric/Behavioral: Negative for confusion           Historical Information   Past Medical History:   Diagnosis Date    Disease of thyroid gland     GERD (gastroesophageal reflux disease)     Hypertension      Past Surgical History:   Procedure Laterality Date    HYSTERECTOMY      REDUCTION MAMMAPLASTY Bilateral     KAREEM-EN-Y PROCEDURE      open bypass and jeff Dr Gale Antony History   Social History     Substance and Sexual Activity   Alcohol Use Not Currently     Social History     Substance and Sexual Activity   Drug Use Never     Social History     Tobacco Use   Smoking Status Never Smoker   Smokeless Tobacco Never Used     Family History   Problem Relation Age of Onset    Hypertension Mother     Heart disease Mother     COPD Mother     Deep vein thrombosis Mother        Meds/Allergies     Medications Prior to Admission   Medication    Biotin 1 MG CAPS    ergocalciferol (ERGOCALCIFEROL) 1 25 MG (97966 UT) capsule    escitalopram (LEXAPRO) 20 mg tablet    HYDROcodone-acetaminophen (NORCO) 5-325 mg per tablet    levothyroxine 75 mcg tablet    Pantoprazole Sodium (PROTONIX PO)    valsartan 160 mg TABS 160 mg, hydrochlorothiazide 12 5 mg TABS 12 5 mg     Current Facility-Administered Medications   Medication Dose Route Frequency    amLODIPine (NORVASC) tablet 5 mg  5 mg Oral Daily    heparin (porcine) subcutaneous injection 5,000 Units  5,000 Units Subcutaneous Q8H [address]    HYDROmorphone (DILAUDID) injection 0 5 mg  0 5 mg Intravenous Q3H PRN    HYDROmorphone (DILAUDID) injection 1 mg  1 mg Intravenous Q4H PRN    ibuprofen (MOTRIN) tablet 200 mg  200 mg Oral Q6H PRN    labetalol (NORMODYNE) injection 10 mg  10 mg Intravenous Q6H PRN    levothyroxine tablet 75 mcg  75 mcg Oral Daily    ondansetron (ZOFRAN) injection 4 mg  4 mg Intravenous Q6H PRN    pantoprazole (PROTONIX) EC tablet 20 mg  20 mg Oral BID    [START ON 9/10/2022] pantoprazole (PROTONIX) injection 40 mg  40 mg Intravenous Q24H CARIDAD    piperacillin-tazobactam (ZOSYN) 2 25 g in sodium chloride 0 9 % 50 mL IVPB  2 25 g Intravenous Q6H    sodium chloride 0 9 % infusion  125 mL/hr Intravenous Continuous       No Known Allergies        Objective     Blood pressure 126/62, pulse 86, temperature 99 3 °F (37 4 °C), resp  rate 18, height 5' 3" (1 6 m), weight 93 1 kg (205 lb 4 oz), SpO2 94 %  Body mass index is 36 36 kg/m²  Intake/Output Summary (Last 24 hours) at 9/9/2022 1520  Last data filed at 9/9/2022 1315  Gross per 24 hour   Intake --   Output 200 ml   Net -200 ml         PHYSICAL EXAM:      Physical Exam  Constitutional:       General: She is not in acute distress  Appearance: She is obese  HENT:      Head: Normocephalic  Eyes:      General: No scleral icterus  Cardiovascular:      Rate and Rhythm: Normal rate  Pulmonary:      Effort: Pulmonary effort is normal    Abdominal:      General: There is no distension  Palpations: Abdomen is soft  Tenderness: There is no abdominal tenderness  There is no guarding  Musculoskeletal:         General: No swelling  Cervical back: Neck supple  Skin:     Coloration: Skin is not jaundiced  Neurological:      Mental Status: She is oriented to person, place, and time  Psychiatric:         Mood and Affect: Mood normal              Lab Results:   I have reviewed pertinent labs: Most Recent Labs:   Lab Results   Component Value Date    WBC 7 32 09/09/2022    RBC 3 80 (L) 09/09/2022    HGB 11 1 (L) 09/09/2022    HCT 33 9 (L) 09/09/2022     09/09/2022    RDW 13 9 09/09/2022    NEUTROABS 6 33 09/09/2022    SODIUM 137 09/09/2022    K 4 1 09/09/2022     09/09/2022    CO2 25 09/09/2022    BUN 21 09/09/2022    CREATININE 1 39 (H) 09/09/2022    GLUC 128 09/09/2022    CALCIUM 8 5 09/09/2022     (H) 09/09/2022     (H) 09/09/2022    ALKPHOS 559 (H) 09/09/2022    TP 6 1 (L) 09/09/2022    ALB 3 1 (L) 09/09/2022    TBILI 3 24 (H) 09/09/2022    TRIG 45 09/09/2022    [id number] 0 301 (L) 09/09/2022    FREET4 1 55 (H) 09/09/2022          Imaging Studies: I have personally reviewed pertinent imaging studies

## 2022-09-09 NOTE — PLAN OF CARE
Problem: Potential for Falls  Goal: Patient will remain free of falls  Description: INTERVENTIONS:  - Educate patient/family on patient safety including physical limitations  - Instruct patient to call for assistance with activity   - Consult OT/PT to assist with strengthening/mobility   - Keep Call bell within reach  - Keep bed low and locked with side rails adjusted as appropriate  - Keep care items and personal belongings within reach  - Initiate and maintain comfort rounds  - Make Fall Risk Sign visible to staff  - Apply yellow socks and bracelet for high fall risk patients  - Consider moving patient to room near nurses station  Outcome: Progressing     Problem: MOBILITY - ADULT  Goal: Maintain or return to baseline ADL function  Description: INTERVENTIONS:  -  Assess patient's ability to carry out ADLs; assess patient's baseline for ADL function and identify physical deficits which impact ability to perform ADLs (bathing, care of mouth/teeth, toileting, grooming, dressing, etc )  - Assess/evaluate cause of self-care deficits   - Assess range of motion  - Assess patient's mobility; develop plan if impaired  - Assess patient's need for assistive devices and provide as appropriate  - Encourage maximum independence but intervene and supervise when necessary  - Involve family in performance of ADLs  - Assess for home care needs following discharge   - Consider OT consult to assist with ADL evaluation and planning for discharge  - Provide patient education as appropriate  Outcome: Progressing  Goal: Maintains/Returns to pre admission functional level  Description: INTERVENTIONS:  - Perform BMAT or MOVE assessment daily    - Set and communicate daily mobility goal to care team and patient/family/caregiver     - Collaborate with rehabilitation services on mobility goals if consulted  - Out of bed for toileting  - Record patient progress and toleration of activity level   Outcome: Progressing     Problem: PAIN - ADULT  Goal: Verbalizes/displays adequate comfort level or baseline comfort level  Description: Interventions:  - Encourage patient to monitor pain and request assistance  - Assess pain using appropriate pain scale  - Administer analgesics based on type and severity of pain and evaluate response  - Implement non-pharmacological measures as appropriate and evaluate response  - Consider cultural and social influences on pain and pain management  - Notify physician/advanced practitioner if interventions unsuccessful or patient reports new pain  Outcome: Progressing     Problem: INFECTION - ADULT  Goal: Absence or prevention of progression during hospitalization  Description: INTERVENTIONS:  - Assess and monitor for signs and symptoms of infection  - Monitor lab/diagnostic results  - Monitor all insertion sites, i e  indwelling lines, tubes, and drains  - Monitor endotracheal if appropriate and nasal secretions for changes in amount and color  - [place] appropriate cooling/warming therapies per order  - Administer medications as ordered  - Instruct and encourage patient and family to use good hand hygiene technique  - Identify and instruct in appropriate isolation precautions for identified infection/condition  Outcome: Progressing  Goal: Absence of fever/infection during neutropenic period  Description: INTERVENTIONS:  - Monitor WBC    Outcome: Progressing     Problem: SAFETY ADULT  Goal: Patient will remain free of falls  Description: INTERVENTIONS:  - Educate patient/family on patient safety including physical limitations  - Instruct patient to call for assistance with activity   - Consult OT/PT to assist with strengthening/mobility   - Keep Call bell within reach  - Keep bed low and locked with side rails adjusted as appropriate  - Keep care items and personal belongings within reach  - Initiate and maintain comfort rounds  - Make Fall Risk Sign visible to staff  - Apply yellow socks and bracelet for high fall risk patients  - Consider moving patient to room near nurses station  Outcome: Progressing  Goal: Maintain or return to baseline ADL function  Description: INTERVENTIONS:  -  Assess patient's ability to carry out ADLs; assess patient's baseline for ADL function and identify physical deficits which impact ability to perform ADLs (bathing, care of mouth/teeth, toileting, grooming, dressing, etc )  - Assess/evaluate cause of self-care deficits   - Assess range of motion  - Assess patient's mobility; develop plan if impaired  - Assess patient's need for assistive devices and provide as appropriate  - Encourage maximum independence but intervene and supervise when necessary  - Involve family in performance of ADLs  - Assess for home care needs following discharge   - Consider OT consult to assist with ADL evaluation and planning for discharge  - Provide patient education as appropriate  Outcome: Progressing  Goal: Maintains/Returns to pre admission functional level  Description: INTERVENTIONS:  - Perform BMAT or MOVE assessment daily    - Set and communicate daily mobility goal to care team and patient/family/caregiver     - Collaborate with rehabilitation services on mobility goals if consulted  - Out of bed for toileting  - Record patient progress and toleration of activity level   Outcome: Progressing     Problem: DISCHARGE PLANNING  Goal: Discharge to home or other facility with appropriate resources  Description: INTERVENTIONS:  - Identify barriers to discharge w/patient and caregiver  - Arrange for needed discharge resources and transportation as appropriate  - Identify discharge learning needs (meds, wound care, etc )  - Arrange for interpretive services to assist at discharge as needed  - Refer to Case Management Department for coordinating discharge planning if the patient needs post-hospital services based on physician/advanced practitioner order or complex needs related to functional status, cognitive ability, or social support system  Outcome: Progressing     Problem: Knowledge Deficit  Goal: Patient/family/caregiver demonstrates understanding of disease process, treatment plan, medications, and discharge instructions  Description: Complete learning assessment and assess knowledge base  Interventions:  - Provide teaching at level of understanding  - Provide teaching via preferred learning methods  Outcome: Progressing     Problem: Nutrition/Hydration-ADULT  Goal: Nutrient/Hydration intake appropriate for improving, restoring or maintaining nutritional needs  Description: Monitor and assess patient's nutrition/hydration status for malnutrition  Collaborate with interdisciplinary team and initiate plan and interventions as ordered  Monitor patient's weight and dietary intake as ordered or per policy  Utilize nutrition screening tool and intervene as necessary  Determine patient's food preferences and provide high-protein, high-caloric foods as appropriate       INTERVENTIONS:  - Monitor oral intake, urinary output, labs, and treatment plans  - Assess nutrition and hydration status and recommend course of action  - Evaluate amount of meals eaten  - Assist patient with eating if necessary   - Allow adequate time for meals  - Recommend/ encourage appropriate diets, oral nutritional supplements, and vitamin/mineral supplements  - Order, calculate, and assess calorie counts as needed  - Recommend, monitor, and adjust tube feedings and TPN/PPN based on assessed needs  - Assess need for intravenous fluids  - Provide specific nutrition/hydration education as appropriate  - Include patient/family/caregiver in decisions related to nutrition  Outcome: Progressing     Problem: METABOLIC, FLUID AND ELECTROLYTES - ADULT  Goal: Electrolytes maintained within normal limits  Description: INTERVENTIONS:  - Monitor labs and assess patient for signs and symptoms of electrolyte imbalances  - Administer electrolyte replacement as ordered  - Monitor response to electrolyte replacements, including repeat lab results as appropriate  - Instruct patient on fluid and nutrition as appropriate  Outcome: Progressing  Goal: Fluid balance maintained  Description: INTERVENTIONS:  - Monitor labs   - Monitor I/O and WT  - Instruct patient on fluid and nutrition as appropriate  - Assess for signs & symptoms of volume excess or deficit  Outcome: Progressing  Goal: Glucose maintained within target range  Description: INTERVENTIONS:  - Monitor Blood Glucose as ordered  - Assess for signs and symptoms of hyperglycemia and hypoglycemia  - Administer ordered medications to maintain glucose within target range  - Assess nutritional intake and initiate nutrition service referral as needed  Outcome: Progressing

## 2022-09-09 NOTE — CONSULTS
Consultation - General Surgery   Benny Luther 65 y o  female MRN: [id number]  Unit/Bed#: -01 Encounter: [id number]    Assessment:  65 y o  female w/ acute pancreatitis    MRCP IMPRESSION:  - Diffuse loss of normal MR signal of the pancreas without significant peripancreatic inflammation is 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) in this patient with elevated lipase  - Of note, the absence of intravenous contrast limits assessment for necrosis, though the diagnosis is supported by the CT (contrast-enhanced) findings from one day prior  There are no acute necrotic collections  Biliary ductal dilatation can be attributable to the acute pancreatitis; no choledocholithiasis  CTAP IMPRESSION:  1  Findings consistent with acute pancreatitis and concern for diffuse pancreatic necrosis  2   Dilatation of intrahepatic and common bile ducts, and excess of expected postsurgical change from cholecystectomy, possibly secondary to acute pancreatitis    MRCP may be helpful for further evaluation     - Tmax 99 3, episodes of HTN overnight, otherwise VSS on room air  - WBC 7 32 (7 77)  - Hgb stable 11 1 (12 6)  - Lipase 1791 (5360)  - Transaminitis -  (440),  (263), Alk Phos 559 (544), T bili 3 24 (3 33)  - CKD - Elevated Cr - 1 39 (1 31)  - HTN, hypothyroid, chronic pain syndrome - Hydrocodone BID PRN at home     Plan:  - No acute surgical intervention planned at this facility  - Bowel rest - NPO - sips of clears, IVF   - Monitor vitals  - Trend labs - lipase, WBC  - Serial exams  - Appreciate GI recs  - Pain/nausea control PRN  - If patient were to develop more significant indication of infection w/ pancreatic necrosis would need tertiary level of care  - Co-morbidities per primary service  _______________________________________________________________  Physician Requesting Consult: Holli Guzman MD    Additional consultants: N/A    Reason for Consult / Principal Problem: Pancreatitis    HPI: Lea Odonnell is a 65y o  year old female with past surgical history significant for gastric bypass, cholecystectomy in 1997, and hysterectomy with past medical history significant for GERD, HTN, and 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 who presented to [address] [place] [address] ED 9/8 with upper abdominal pain  She explains the pain began yesterday before lunch and initially she questioned if she was experiencing GERD like symptoms after taking narcotic pain medication on an empty stomach, but the pain became worse when she was eating lunch leading her to the ED  In ED CT was significant for findings above and surgery team was contacted  Patient was made a medical admission with MRCP recommended  At time of exam patient explains that she has been tolerating sips of clears and she has had some relief in her pain  At time of extreme pain she did have episodes of nausea without vomiting, had some shakes/chills, and constant pain in a band-like fashion in the upper abdomen  Patient denies shortness of breath, chest tightness, issues with urination, or lower abdominal pain  She does admit to some constipation at times but states this has been chronic (likely second to chronic pain medication) and last BM was yesterday      Historical Information   Past Medical History:   Diagnosis Date    Disease of thyroid gland     GERD (gastroesophageal reflux disease)     Hypertension      Past Surgical History:   Procedure Laterality Date    HYSTERECTOMY      REDUCTION MAMMAPLASTY Bilateral     KAREEM-EN-Y PROCEDURE      open bypass and jeff Dr [facility] History   Social History     Substance and Sexual Activity   Alcohol Use Not Currently     Social History     Substance and Sexual Activity   Drug Use Never     Social History     Tobacco Use   Smoking Status Never Smoker   Smokeless Tobacco Never Used     Family History: non-contributory    Meds/Allergies   Home meds:   Prior to Admission medications Medication Sig Start Date End Date Taking?  Authorizing Provider   Biotin 1 MG CAPS Take by mouth   Yes Historical Provider, MD   ergocalciferol (ERGOCALCIFEROL) 1 25 MG (13876 UT) capsule Take 50,000 Units by mouth once a week   Yes Historical Provider, MD   escitalopram (LEXAPRO) 20 mg tablet Take 20 mg by mouth daily   Yes Historical Provider, MD   HYDROcodone-acetaminophen (NORCO) 5-325 mg per tablet Take 1 tablet by mouth 2 (two) times a day   Yes Historical Provider, MD   levothyroxine 75 mcg tablet Take 75 mcg by mouth daily Unsure of the correct dosage   Yes Historical Provider, MD   Pantoprazole Sodium (PROTONIX PO) Take 20 mg by mouth 2 (two) times a day   Yes Historical Provider, MD   valsartan 160 mg TABS 160 mg, hydrochlorothiazide 12 5 mg TABS 12 5 mg Take by mouth daily   Yes Historical Provider, MD   cholecalciferol (VITAMIN D3) 1,000 units tablet Take 2,200 Units by mouth once a week wednesday 9/9/22 Yes Historical Provider, MD     Scheduled Meds:  Current Facility-Administered Medications   Medication Dose Route Frequency Provider Last Rate    heparin (porcine)  5,000 Units Subcutaneous Q8H [facility] Winter S Mark, CRNP      HYDROmorphone  0 5 mg Intravenous Q3H PRN Winter S Mark, CRNP      HYDROmorphone  1 mg Intravenous Q4H PRN Winter S Mark, CRNP      labetalol  10 mg Intravenous Q6H PRN Winter S Mark, CRNP      ondansetron  4 mg Intravenous Q6H PRN Winter S Mark, CRNP      [START ON 9/10/2022] pantoprazole  40 mg Intravenous Q24H [facility] Winter S Mark, CRNP      piperacillin-tazobactam  2 25 g Intravenous Q6H Winter S Mark, CRNP 2 25 g (09/09/22 0915)    sodium chloride  125 mL/hr Intravenous Continuous Winter S Mark, CRNP 125 mL/hr (09/09/22 0122)     Continuous Infusions:sodium chloride, 125 mL/hr, Last Rate: 125 mL/hr (09/09/22 0122)    PRN Meds:    HYDROmorphone    HYDROmorphone    labetalol    ondansetron    ALLERGIES: No Known Allergies    Review of Systems:  General: positive for  - chills  negative for - fatigue or fever  Cardiovascular: no chest pain or dyspnea on exertion  Respiratory: no cough, shortness of breath, or wheezing  Gastrointestinal: positive for - abdominal pain, constipation and nausea  negative for - diarrhea, gas/bloating or vomiting  Genitourinary: no dysuria, trouble voiding, or hematuria  Musculoskeletal: negative for - joint stiffness or muscular weakness  Neurological: positive for - headaches  negative for - confusion or dizziness  Hematological and Lymphatic: negative  Dermatological : negative  Psychological: negative    Objective   Vitals:  Blood pressure 126/62, pulse 86, temperature 99 3 °F (37 4 °C), resp  rate 18, height 5' 3" (1 6 m), weight 93 1 kg (205 lb 4 oz), SpO2 94 %  Body mass index is 36 36 kg/m²  SpO2: SpO2: 94 %    I/Os:  I/O       09/07 0701 09/08 0700 09/08 0701 09/09 0700 09/09 0701  09/10 0700           Unmeasured Urine Occurrence  1 x         Invasive Lines/Tubes:  Invasive Devices  Report    Peripheral Intravenous Line  Duration           Peripheral IV 09/08/22 Left Hand <1 day              Physical Exam  Constitutional:       General: She is not in acute distress  Appearance: She is not ill-appearing  Comments: Comfortable appearing  HENT:      Head: Normocephalic and atraumatic  Nose: Nose normal       Mouth/Throat:      Mouth: Mucous membranes are moist       Pharynx: Oropharynx is clear  Eyes:      Extraocular Movements: Extraocular movements intact  Pupils: Pupils are equal, round, and reactive to light  Cardiovascular:      Rate and Rhythm: Normal rate  Pulmonary:      Effort: Pulmonary effort is normal  No respiratory distress  Breath sounds: No wheezing  Abdominal:      General: Abdomen is flat  There is no distension  Palpations: Abdomen is soft  Tenderness: There is abdominal tenderness in the epigastric area and left upper quadrant  There is no guarding or rebound     Musculoskeletal: General: No swelling or deformity  Skin:     General: Skin is warm and dry  Capillary Refill: Capillary refill takes less than 2 seconds  Neurological:      General: No focal deficit present  Mental Status: She is alert  Mental status is at baseline  Psychiatric:         Mood and Affect: Mood normal          Behavior: Behavior normal      Lab Results and Cultures:   COVID:   Last COVID19 Screening Values     None         CBC:   Results from last 7 days   Lab Units 09/09/22  0622 09/08/22  2112   WBC Thousand/uL 7 32 7 77   HEMOGLOBIN g/dL 11 1* 12 6   HEMATOCRIT % 33 9* 38 6   PLATELETS Thousands/uL 280 341     BMP/CMP:  Results from last 7 days   Lab Units 09/09/22  0622 09/08/22  2112   POTASSIUM mmol/L 4 1 4 6   CHLORIDE mmol/L 102 101   CO2 mmol/L 25 26   BUN mg/dL 21 25   CREATININE mg/dL 1 39* 1 31*   CALCIUM mg/dL 8 5 9 0     Coags:   Results from last 7 days   Lab Units 09/09/22  0622   INR  0 99     Lipid panel:   Results from last 7 days   Lab Units 09/09/22 0622   TRIGLYCERIDES mg/dL 45     HgbA1c: No results found for: HGBA1C    Urinalysis: No results found for: COLORU, CLARITYU, SPECGRAV, PHUR, LEUKOCYTESUR, NITRITE, PROTEINUA, GLUCOSEU, KETONESU, BILIRUBINUR, BLOODU,   Urine Culture: No results found for: URINECX  Wound Culure: No results found for: WOUNDCULT  Blood Culture: No results found for: BLOODCX    Imaging Studies: I have personally reviewed pertinent reports  EKG, Pathology, and Other Studies: I have personally reviewed pertinent reports  VTE Prophylaxis: Heparin     Code Status: Level 1 - Full Code  Advance Directive and Living Will:      Power of :    POLST:      Counseling / Coordination of Care   Counseling/Coordination of Care: Total floor / unit time spent today 40 minutes  Greater than 50% of total time was spent with the patient and / or family counseling and / or coordination of care   A description of the counseling / coordination of care: review of chart, labs, history, discussion with patient/family at bedside to include history, physical, assessment, and plan        [place], [place]  9/9/2022

## 2022-09-09 NOTE — ASSESSMENT & PLAN NOTE
· Developed acute severe abdominal pain at 2 p m   · Transaminase elevation-remote cholecystectomy 1997   · Lipase 5360  · CT abdomen pelvis- Mild distention of intrahepatic bile ducts  CBD measures up to 16 mm   Acute pancreatitis and concern for diffuse pancreatic necrosis  · Check MRCP- rule out CBD obstruction  · Empiric Zosyn  · Check procalcitonin  · Pain control  · NPO  · IV hydration  · Appreciate general surgery consultation- aware after discussion with ED attending

## 2022-09-10 LAB
ALBUMIN SERPL BCP-MCNC: 2.6 G/DL (ref 3.5–5)
ALP SERPL-CCNC: 531 U/L (ref 46–116)
ALT SERPL W P-5'-P-CCNC: 263 U/L (ref 12–78)
ANION GAP SERPL CALCULATED.3IONS-SCNC: 12 MMOL/L (ref 4–13)
AST SERPL W P-5'-P-CCNC: 180 U/L (ref 5–45)
BILIRUB SERPL-MCNC: 4.27 MG/DL (ref 0.2–1)
BUN SERPL-MCNC: 21 MG/DL (ref 5–25)
CALCIUM ALBUM COR SERPL-MCNC: 9 MG/DL (ref 8.3–10.1)
CALCIUM SERPL-MCNC: 7.9 MG/DL (ref 8.3–10.1)
CHLORIDE SERPL-SCNC: 104 MMOL/L (ref 96–108)
CO2 SERPL-SCNC: 21 MMOL/L (ref 21–32)
CREAT SERPL-MCNC: 1.41 MG/DL (ref 0.6–1.3)
ERYTHROCYTE [DISTWIDTH] IN BLOOD BY AUTOMATED COUNT: 14.1 % (ref 11.6–15.1)
GFR SERPL CREATININE-BSD FRML MDRD: 35 ML/MIN/1.73SQ M
GLUCOSE SERPL-MCNC: 79 MG/DL (ref 65–140)
HAV IGM SER QL: NORMAL
HBV CORE IGM SER QL: NORMAL
HBV SURFACE AG SER QL: NORMAL
HCT VFR BLD AUTO: 32 % (ref 34.8–46.1)
HCV AB SER QL: NORMAL
HGB BLD-MCNC: 10.1 G/DL (ref 11.5–15.4)
LIPASE SERPL-CCNC: 120 U/L (ref 73–393)
MCH RBC QN AUTO: 28.7 PG (ref 26.8–34.3)
MCHC RBC AUTO-ENTMCNC: 31.6 G/DL (ref 31.4–37.4)
MCV RBC AUTO: 91 FL (ref 82–98)
PLATELET # BLD AUTO: 214 THOUSANDS/UL (ref 149–390)
PMV BLD AUTO: 10.1 FL (ref 8.9–12.7)
POTASSIUM SERPL-SCNC: 3.9 MMOL/L (ref 3.5–5.3)
PROCALCITONIN SERPL-MCNC: 0.35 NG/ML
PROT SERPL-MCNC: 5.4 G/DL (ref 6.4–8.4)
RBC # BLD AUTO: 3.52 MILLION/UL (ref 3.81–5.12)
SODIUM SERPL-SCNC: 137 MMOL/L (ref 135–147)
WBC # BLD AUTO: 4.67 THOUSAND/UL (ref 4.31–10.16)

## 2022-09-10 PROCEDURE — C9113 INJ PANTOPRAZOLE SODIUM, VIA: HCPCS | Performed by: NURSE PRACTITIONER

## 2022-09-10 PROCEDURE — 99232 SBSQ HOSP IP/OBS MODERATE 35: CPT | Performed by: FAMILY MEDICINE

## 2022-09-10 PROCEDURE — 84145 PROCALCITONIN (PCT): CPT | Performed by: NURSE PRACTITIONER

## 2022-09-10 PROCEDURE — 80074 ACUTE HEPATITIS PANEL: CPT | Performed by: INTERNAL MEDICINE

## 2022-09-10 PROCEDURE — 85027 COMPLETE CBC AUTOMATED: CPT | Performed by: FAMILY MEDICINE

## 2022-09-10 PROCEDURE — 80053 COMPREHEN METABOLIC PANEL: CPT | Performed by: FAMILY MEDICINE

## 2022-09-10 PROCEDURE — 83690 ASSAY OF LIPASE: CPT | Performed by: FAMILY MEDICINE

## 2022-09-10 RX ORDER — OXYCODONE HYDROCHLORIDE 5 MG/1
5 TABLET ORAL EVERY 6 HOURS PRN
Status: DISCONTINUED | OUTPATIENT
Start: 2022-09-10 | End: 2022-09-11 | Stop reason: HOSPADM

## 2022-09-10 RX ORDER — LEVOTHYROXINE SODIUM 0.07 MG/1
75 TABLET ORAL
Status: DISCONTINUED | OUTPATIENT
Start: 2022-09-10 | End: 2022-09-11 | Stop reason: HOSPADM

## 2022-09-10 RX ADMIN — HEPARIN SODIUM 5000 UNITS: 5000 INJECTION INTRAVENOUS; SUBCUTANEOUS at 21:35

## 2022-09-10 RX ADMIN — SODIUM CHLORIDE 75 ML/HR: 0.9 INJECTION, SOLUTION INTRAVENOUS at 12:40

## 2022-09-10 RX ADMIN — HEPARIN SODIUM 5000 UNITS: 5000 INJECTION INTRAVENOUS; SUBCUTANEOUS at 05:09

## 2022-09-10 RX ADMIN — PANTOPRAZOLE SODIUM 40 MG: 40 INJECTION, POWDER, FOR SOLUTION INTRAVENOUS at 08:42

## 2022-09-10 RX ADMIN — ESCITALOPRAM OXALATE 20 MG: 10 TABLET ORAL at 08:08

## 2022-09-10 RX ADMIN — OXYCODONE HYDROCHLORIDE 5 MG: 5 TABLET ORAL at 10:06

## 2022-09-10 RX ADMIN — SODIUM CHLORIDE 125 ML/HR: 0.9 INJECTION, SOLUTION INTRAVENOUS at 05:11

## 2022-09-10 RX ADMIN — HEPARIN SODIUM 5000 UNITS: 5000 INJECTION INTRAVENOUS; SUBCUTANEOUS at 14:59

## 2022-09-10 NOTE — ASSESSMENT & PLAN NOTE
· s/p ORYGBP with known gastric-gastric fistula diagnosed 2013  · Followed by bariatric surgery at [facility]  · Continue PPI

## 2022-09-10 NOTE — NURSING NOTE
Pt having  bring in home synthroid and valsartan and refusing ordered medications at this time  Dr Johanna Armendariz made aware

## 2022-09-10 NOTE — ASSESSMENT & PLAN NOTE
· TSH and free T4 level abnormal however okay to continue home regimen of levothyroxine  · Resume levothyroxine 75 mcg daily

## 2022-09-10 NOTE — PROGRESS NOTES
[address]  Progress Note - Jonathon Sarah 1945, 65 y o  female MRN: [id number]  Unit/Bed#: -01 Encounter: [id number]  Primary Care Provider: Regulo Busby MD   Date and time admitted to hospital: 9/8/2022  8:47 PM    * Acute pancreatitis  Assessment & Plan  · History of mild bouts of abdominal pain at home however this time she had severe pain and came to the ED  · Transaminase and lipase elevation-remote cholecystectomy 1997   · Lipase 5360  · No history of alcohol abuse drug abuse medications that may cause pancreatitis, normal triglyceride level  Acute hepatitis panel pending  Unclear etiology of acute pancreatitis  · CT abdomen pelvis- Mild distention of intrahepatic bile ducts  CBD measures up to 16 mm  Acute pancreatitis and concern for diffuse pancreatic necrosis  MRCP shows Diffuse loss of normal MR signal of the pancreas without significant peripancreatic inflammation is 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) in this patient with elevated lipase  Of note, the absence of intravenous contrast limits assessment for necrosis, though the diagnosis is supported by the CT (contrast-enhanced) findings from one day prior  There are no acute necrotic collections  Biliary ductal dilatation can be attributable to the acute pancreatitis; no choledocholithiasis  Clinically improving pain is improving  Will place on a clear liquid diet and observe for now  Transaminitis and lipase level is improving however bilirubin is gradually trending up  Monitor for now and probably will require EUS outpatient    Patient has 2 first-degree relatives with diet of pancreatic cancer and her daughter was recently diagnosed with acute pancreatitis in her 62s with no obvious etiology as well    Hx of Acquired gastric fistula  Assessment & Plan  · s/p ORYGBP with known gastric-gastric fistula diagnosed 2013  · Followed by bariatric surgery at [facility]  · Continue PPI      Chronic pain syndrome  Assessment & Plan  · Chronic pain syndrome involving lumbar spine  · Followed by pain management Dr Elizabeth Espinoza  · Hydrocodone b i d  P r n  Home regime  · PDMP reviewed- no red flags    Acquired hypothyroidism  Assessment & Plan  · TSH and free T4 level abnormal however okay to continue home regimen of levothyroxine  · Resume levothyroxine 75 mcg daily    CKD (chronic kidney disease) stage 3, GFR 30-59 ml/min [facility])  Assessment & Plan  Lab Results   Component Value Date    EGFR 35 09/10/2022    EGFR 36 09/09/2022    EGFR 39 09/08/2022    CREATININE 1 41 (H) 09/10/2022    CREATININE 1 39 (H) 09/09/2022    CREATININE 1 31 (H) 09/08/2022   · Creatinine 1 31 on admission appears baseline  · Trend creatinine  Renally dose medications  · CT- Left renal 2 2 cm cyst   Symmetric nephrographic phase enhancement of the kidneys  No obstructive uropathy  Primary hypertension  Assessment & Plan  · PTA valsartan/HCTZ  Currently on hold  Will place on Norvasc  Currently blood pressures are controlled      VTE Pharmacologic Prophylaxis:   Pharmacologic: Heparin  Mechanical VTE Prophylaxis in Place: Yes    Patient Centered Rounds: I have performed bedside rounds with nursing staff today  Discussions with Specialists or Other Care Team Provider:  Discussed with GI yesterday    Education and Discussions with Family / Patient:  Discussed with patient at bedside and update     Time Spent for Care: 30 minutes  More than 50% of total time spent on counseling and coordination of care as described above      Current Length of Stay: 1 day(s)    Current Patient Status: Inpatient   Certification Statement: The patient will continue to require additional inpatient hospital stay due to Acute pancreatitis    Discharge Plan:  Anticipate discharge in 48 hours    Code Status: Level 1 - Full Code      Subjective:   Patient denies any chest pain or shortness of breath complains of mild abdominal pain and back pain and requesting her pain medications to be restarted    Objective:     Vitals:   Temp (24hrs), Av 4 °F (36 9 °C), Min:97 9 °F (36 6 °C), Max:98 8 °F (37 1 °C)    Temp:  [97 9 °F (36 6 °C)-98 8 °F (37 1 °C)] 98 4 °F (36 9 °C)  HR:  [60-71] 71  Resp:  [17-18] 18  BP: (106-131)/(50-72) 128/72  SpO2:  [94 %-96 %] 96 %  Body mass index is 36 36 kg/m²  Input and Output Summary (last 24 hours): Intake/Output Summary (Last 24 hours) at 9/10/2022 1239  Last data filed at 9/10/2022 1239  Gross per 24 hour   Intake 4650 42 ml   Output 200 ml   Net 4450 42 ml       Physical Exam:     Physical Exam  Vitals and nursing note reviewed  Constitutional:       Appearance: Normal appearance  HENT:      Head: Normocephalic and atraumatic  Right Ear: External ear normal       Left Ear: External ear normal       Nose: Nose normal       Mouth/Throat:      Pharynx: Oropharynx is clear  Cardiovascular:      Rate and Rhythm: Normal rate and regular rhythm  Heart sounds: Normal heart sounds  Pulmonary:      Effort: Pulmonary effort is normal       Breath sounds: Normal breath sounds  Abdominal:      General: Bowel sounds are normal       Palpations: Abdomen is soft  Tenderness: There is abdominal tenderness  Musculoskeletal:         General: Normal range of motion  Cervical back: Normal range of motion and neck supple  Skin:     General: Skin is warm and dry  Capillary Refill: Capillary refill takes less than 2 seconds  Neurological:      General: No focal deficit present  Mental Status: She is alert and oriented to person, place, and time     Psychiatric:         Mood and Affect: Mood normal            Additional Data:     Labs:    Results from last 7 days   Lab Units 09/10/22  0500 22  0622   WBC Thousand/uL 4 67 7 32   HEMOGLOBIN g/dL 10 1* 11 1*   HEMATOCRIT % 32 0* 33 9*   PLATELETS Thousands/uL 214 280   NEUTROS PCT %  --  87*   LYMPHS PCT %  -- 4*   MONOS PCT %  --  8   EOS PCT %  --  1     Results from last 7 days   Lab Units 09/10/22  0500   SODIUM mmol/L 137   POTASSIUM mmol/L 3 9   CHLORIDE mmol/L 104   CO2 mmol/L 21   BUN mg/dL 21   CREATININE mg/dL 1 41*   ANION GAP mmol/L 12   CALCIUM mg/dL 7 9*   ALBUMIN g/dL 2 6*   TOTAL BILIRUBIN mg/dL 4 27*   ALK PHOS U/L 531*   ALT U/L 263*   AST U/L 180*   GLUCOSE RANDOM mg/dL 79     Results from last 7 days   Lab Units 09/09/22  0622   INR  0 99             Results from last 7 days   Lab Units 09/10/22  0500   PROCALCITONIN ng/ml 0 35*           * I Have Reviewed All Lab Data Listed Above  * Additional Pertinent Lab Tests Reviewed: Franky 65 Admission Reviewed    Imaging:    Imaging Reports Reviewed Today Include:  MRCP  Imaging Personally Reviewed by Myself Includes:  None    Recent Cultures (last 7 days):           Last 24 Hours Medication List:   Current Facility-Administered Medications   Medication Dose Route Frequency Provider Last Rate    amLODIPine  5 mg Oral Daily Joey Richter MD      escitalopram  20 mg Oral Daily Winter S Mark, CRNP      heparin (porcine)  5,000 Units Subcutaneous Q8H [address] Winter S Mark, CRNP      HYDROmorphone  0 5 mg Intravenous Q3H PRN Winter S Mark, CRNP      ibuprofen  200 mg Oral Q6H PRN Joey Richter MD      labetalol  10 mg Intravenous Q6H PRN Winter S Mark, CRNP      levothyroxine  75 mcg Oral Daily Joey Richter MD      ondansetron  4 mg Intravenous Q6H PRN Winter S Mark, CRNP      oxyCODONE  5 mg Oral Q6H PRN Joey Richter MD      pantoprazole  40 mg Intravenous Q24H [address] Winter S Mark, CRNP      sodium chloride  75 mL/hr Intravenous Continuous Joey Richter MD 75 mL/hr (09/10/22 1239)        Today, Patient Was Seen By: Joey Richter MD    ** Please Note: Dictation voice to text software may have been used in the creation of this document   **

## 2022-09-10 NOTE — PLAN OF CARE
Problem: Potential for Falls  Goal: Patient will remain free of falls  Description: INTERVENTIONS:  - Educate patient/family on patient safety including physical limitations  - Instruct patient to call for assistance with activity   - Consult OT/PT to assist with strengthening/mobility   - Keep Call bell within reach  - Keep bed low and locked with side rails adjusted as appropriate  - Keep care items and personal belongings within reach  - Initiate and maintain comfort rounds  - Make Fall Risk Sign visible to staff  - Offer Toileting every 2 Hours, in advance of need  - Initiate/Maintain bed and chair alarm  - Obtain necessary fall risk management equipment: walker  - Apply yellow socks and bracelet for high fall risk patients  - Consider moving patient to room near nurses station  9/10/2022 0731 by Taryn Gold RN  Outcome: Progressing  9/10/2022 0731 by Taryn Gold RN  Outcome: Progressing     Problem: MOBILITY - ADULT  Goal: Maintain or return to baseline ADL function  Description: INTERVENTIONS:  - Educate patient/family on patient safety including physical limitations  - Instruct patient to call for assistance with activity   - Consult OT/PT to assist with strengthening/mobility   - Keep Call bell within reach  - Keep bed low and locked with side rails adjusted as appropriate  - Keep care items and personal belongings within reach  - Initiate and maintain comfort rounds  - Make Fall Risk Sign visible to staff  - Offer Toileting every 2 Hours, in advance of need  - Initiate/Maintain bed and chair alarm  - Obtain necessary fall risk management equipment: walker  - Apply yellow socks and bracelet for high fall risk patients  - Consider moving patient to room near nurses station  9/10/2022 0731 by Taryn Gold RN  Outcome: Progressing  9/10/2022 0731 by Taryn Gold RN  Outcome: Progressing  Goal: Maintains/Returns to pre admission functional level  Description: INTERVENTIONS:  - Perform BMAT or MOVE assessment daily    - Set and communicate daily mobility goal to care team and patient/family/caregiver  - Collaborate with rehabilitation services on mobility goals if consulted  - Perform Range of Motion 4 times a day  - Reposition patient every 2 hours    - Dangle patient 3 times a day  - Stand patient 3 times a day  - Ambulate patient 3 times a day  - Out of bed to chair 3 times a day   - Out of bed for meals 3 times a day  - Out of bed for toileting  - Record patient progress and toleration of activity level   9/10/2022 0731 by Safia Buenrostro RN  Outcome: Progressing  9/10/2022 0731 by Safia Buenrostro RN  Outcome: Progressing     Problem: PAIN - ADULT  Goal: Verbalizes/displays adequate comfort level or baseline comfort level  Description: Interventions:  - Encourage patient to monitor pain and request assistance  - Assess pain using appropriate pain scale  - Administer analgesics based on type and severity of pain and evaluate response  - Implement non-pharmacological measures as appropriate and evaluate response  - Consider cultural and social influences on pain and pain management  - Notify physician/advanced practitioner if interventions unsuccessful or patient reports new pain  9/10/2022 0731 by Safia Buenrostro RN  Outcome: Progressing  9/10/2022 0731 by Safia Buenrostro RN  Outcome: Progressing     Problem: INFECTION - ADULT  Goal: Absence or prevention of progression during hospitalization  Description: INTERVENTIONS:  - Assess and monitor for signs and symptoms of infection  - Monitor lab/diagnostic results  - Monitor all insertion sites, i e  indwelling lines, tubes, and drains  - Monitor endotracheal if appropriate and nasal secretions for changes in amount and color  - [place] appropriate cooling/warming therapies per order  - Administer medications as ordered  - Instruct and encourage patient and family to use good hand hygiene technique  - Identify and instruct in appropriate isolation precautions for identified infection/condition  9/10/2022 0731 by Gauri Erazo RN  Outcome: Progressing  9/10/2022 0731 by Gauri Erazo RN  Outcome: Progressing  Goal: Absence of fever/infection during neutropenic period  Description: INTERVENTIONS:  - Monitor WBC    9/10/2022 0731 by Gauri Erazo RN  Outcome: Progressing  9/10/2022 0731 by Gauri Erazo RN  Outcome: Progressing     Problem: SAFETY ADULT  Goal: Patient will remain free of falls  Description: INTERVENTIONS:  - Educate patient/family on patient safety including physical limitations  - Instruct patient to call for assistance with activity   - Consult OT/PT to assist with strengthening/mobility   - Keep Call bell within reach  - Keep bed low and locked with side rails adjusted as appropriate  - Keep care items and personal belongings within reach  - Initiate and maintain comfort rounds  - Make Fall Risk Sign visible to staff  - Offer Toileting every 2 Hours, in advance of need  - Initiate/Maintain bed and chair alarm  - Obtain necessary fall risk management equipment: walker  - Apply yellow socks and bracelet for high fall risk patients  - Consider moving patient to room near nurses station  9/10/2022 0731 by Gauri Erazo RN  Outcome: Progressing  9/10/2022 0731 by Gauri Erazo RN  Outcome: Progressing  Goal: Maintain or return to baseline ADL function  Description: INTERVENTIONS:  - Educate patient/family on patient safety including physical limitations  - Instruct patient to call for assistance with activity   - Consult OT/PT to assist with strengthening/mobility   - Keep Call bell within reach  - Keep bed low and locked with side rails adjusted as appropriate  - Keep care items and personal belongings within reach  - Initiate and maintain comfort rounds  - Make Fall Risk Sign visible to staff  - Offer Toileting every 2 Hours, in advance of need  - Initiate/Maintain bed and chair alarm  - Obtain necessary fall risk management equipment: walker  - Apply yellow socks and bracelet for high fall risk patients  - Consider moving patient to room near nurses station  9/10/2022 0731 by Sander Mccoy RN  Outcome: Progressing  9/10/2022 0731 by Sander Mccoy RN  Outcome: Progressing  Goal: Maintains/Returns to pre admission functional level  Description: INTERVENTIONS:  - Perform BMAT or MOVE assessment daily    - Set and communicate daily mobility goal to care team and patient/family/caregiver  - Collaborate with rehabilitation services on mobility goals if consulted  - Perform Range of Motion 4 times a day  - Reposition patient every 2 hours    - Dangle patient 3 times a day  - Stand patient 3 times a day  - Ambulate patient 3 times a day  - Out of bed to chair 3 times a day   - Out of bed for meals 3 times a day  - Out of bed for toileting  - Record patient progress and toleration of activity level   9/10/2022 0731 by Sander Mccoy RN  Outcome: Progressing  9/10/2022 0731 by Sander Mccoy RN  Outcome: Progressing     Problem: DISCHARGE PLANNING  Goal: Discharge to home or other facility with appropriate resources  Description: INTERVENTIONS:  - Identify barriers to discharge w/patient and caregiver  - Arrange for needed discharge resources and transportation as appropriate  - Identify discharge learning needs (meds, wound care, etc )  - Arrange for interpretive services to assist at discharge as needed  - Refer to Case Management Department for coordinating discharge planning if the patient needs post-hospital services based on physician/advanced practitioner order or complex needs related to functional status, cognitive ability, or social support system  9/10/2022 0731 by Sander Mccoy RN  Outcome: Progressing  9/10/2022 0731 by Sander Mccoy RN  Outcome: Progressing     Problem: Knowledge Deficit  Goal: Patient/family/caregiver demonstrates understanding of disease process, treatment plan, medications, and discharge instructions  Description: Complete learning assessment and assess knowledge base  Interventions:  - Provide teaching at level of understanding  - Provide teaching via preferred learning methods  9/10/2022 0731 by Yoel Carver RN  Outcome: Progressing  9/10/2022 0731 by Yoel Carver RN  Outcome: Progressing     Problem: Nutrition/Hydration-ADULT  Goal: Nutrient/Hydration intake appropriate for improving, restoring or maintaining nutritional needs  Description: Monitor and assess patient's nutrition/hydration status for malnutrition  Collaborate with interdisciplinary team and initiate plan and interventions as ordered  Monitor patient's weight and dietary intake as ordered or per policy  Utilize nutrition screening tool and intervene as necessary  Determine patient's food preferences and provide high-protein, high-caloric foods as appropriate       INTERVENTIONS:  - Monitor oral intake, urinary output, labs, and treatment plans  - Assess nutrition and hydration status and recommend course of action  - Evaluate amount of meals eaten  - Assist patient with eating if necessary   - Allow adequate time for meals  - Recommend/ encourage appropriate diets, oral nutritional supplements, and vitamin/mineral supplements  - Order, calculate, and assess calorie counts as needed  - Recommend, monitor, and adjust tube feedings and TPN/PPN based on assessed needs  - Assess need for intravenous fluids  - Provide specific nutrition/hydration education as appropriate  - Include patient/family/caregiver in decisions related to nutrition  9/10/2022 0731 by Yeol Carver RN  Outcome: Progressing  9/10/2022 0731 by Yoel Carver RN  Outcome: Progressing     Problem: METABOLIC, FLUID AND ELECTROLYTES - ADULT  Goal: Electrolytes maintained within normal limits  Description: INTERVENTIONS:  - Monitor labs and assess patient for signs and symptoms of electrolyte imbalances  - Administer electrolyte replacement as ordered  - Monitor response to electrolyte replacements, including repeat lab results as appropriate  - Instruct patient on fluid and nutrition as appropriate  9/10/2022 0731 by Yoel Carver RN  Outcome: Progressing  9/10/2022 0731 by Yoel Carver RN  Outcome: Progressing  Goal: Fluid balance maintained  Description: INTERVENTIONS:  - Monitor labs   - Monitor I/O and WT  - Instruct patient on fluid and nutrition as appropriate  - Assess for signs & symptoms of volume excess or deficit  9/10/2022 0731 by Yoel Carver RN  Outcome: Progressing  9/10/2022 0731 by Yoel Carver RN  Outcome: Progressing     Problem: Prexisting or High Potential for Compromised Skin Integrity  Goal: Skin integrity is maintained or improved  Description: INTERVENTIONS:  - Identify patients at risk for skin breakdown  - Assess and monitor skin integrity  - Assess and monitor nutrition and hydration status  - Monitor labs   - Assess for incontinence   - Turn and reposition patient  - Assist with mobility/ambulation  - Relieve pressure over bony prominences  - Avoid friction and shearing  - Provide appropriate hygiene as needed including keeping skin clean and dry  - Evaluate need for skin moisturizer/barrier cream  - Collaborate with interdisciplinary team   - Patient/family teaching  - Consider wound care consult   9/10/2022 0731 by Yoel Carver RN  Outcome: Progressing  9/10/2022 0731 by Yoel Carver RN  Outcome: Progressing

## 2022-09-10 NOTE — ASSESSMENT & PLAN NOTE
· PTA valsartan/HCTZ  Currently on hold  Will place on Norvasc    Currently blood pressures are controlled

## 2022-09-10 NOTE — ASSESSMENT & PLAN NOTE
Lab Results   Component Value Date    EGFR 35 09/10/2022    EGFR 36 09/09/2022    EGFR 39 09/08/2022    CREATININE 1 41 (H) 09/10/2022    CREATININE 1 39 (H) 09/09/2022    CREATININE 1 31 (H) 09/08/2022   · Creatinine 1 31 on admission appears baseline  · Trend creatinine  Renally dose medications  · CT- Left renal 2 2 cm cyst   Symmetric nephrographic phase enhancement of the kidneys  No obstructive uropathy

## 2022-09-10 NOTE — ASSESSMENT & PLAN NOTE
· History of mild bouts of abdominal pain at home however this time she had severe pain and came to the ED  · Transaminase and lipase elevation-remote cholecystectomy 1997   · Lipase 5360  · No history of alcohol abuse drug abuse medications that may cause pancreatitis, normal triglyceride level  Acute hepatitis panel pending  Unclear etiology of acute pancreatitis  · CT abdomen pelvis- Mild distention of intrahepatic bile ducts  CBD measures up to 16 mm  Acute pancreatitis and concern for diffuse pancreatic necrosis  MRCP shows Diffuse loss of normal MR signal of the pancreas without significant peripancreatic inflammation is 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) in this patient with elevated lipase  Of note, the absence of intravenous contrast limits assessment for necrosis, though the diagnosis is supported by the CT (contrast-enhanced) findings from one day prior  There are no acute necrotic collections  Biliary ductal dilatation can be attributable to the acute pancreatitis; no choledocholithiasis  Clinically improving pain is improving  Will place on a clear liquid diet and observe for now  Transaminitis and lipase level is improving however bilirubin is gradually trending up  Monitor for now and probably will require EUS outpatient    Patient has 2 first-degree relatives with diet of pancreatic cancer and her daughter was recently diagnosed with acute pancreatitis in her 62s with no obvious etiology as well

## 2022-09-10 NOTE — PROGRESS NOTES
Progress Note - General Surgery   Neftali Kessler 65 y o  female MRN: [id number]  Unit/Bed#: -01 Encounter: [id number]    Assessment:  64 yo F with acute pancreatitis with CT findings c/f necrotizing pancreatitis  Hx of cholecystectomy in the 90s  TG nl  Denies ETOH use  AVSS, WBC 4  LFTs improving, although Tbili 4 2 (3 2, 2 2)  MRI negative for CBD stone  Lipase 120 (1700, 5300)    Plan:  - no acute surgical intervention warranted at this time  - ok to advance to [facility]  - appreciate GI recs  Monitor Tbili  - If patient were to clinically worsen or require intervention, would need transfer to tertiary level of care  - cont to monitor vitals, labs  - prn pain, antiemetic regimen  - dvt ppx    Rest of care per primary      Subjective/Objective     Subjective: feels well, without abd pain  Asking when she can go home  Denies fevers, chills, n/v     Objective:     Blood pressure 128/72, pulse 71, temperature 98 4 °F (36 9 °C), resp  rate 18, height 5' 3" (1 6 m), weight 93 1 kg (205 lb 4 oz), SpO2 96 %  ,Body mass index is 36 36 kg/m²  Intake/Output Summary (Last 24 hours) at 9/10/2022 1002  Last data filed at 9/10/2022 0803  Gross per 24 hour   Intake 4075 42 ml   Output 200 ml   Net 3875 42 ml       Invasive Devices  Report    Peripheral Intravenous Line  Duration           Peripheral IV 09/08/22 Left Hand 1 day                Physical Exam  Vitals and nursing note reviewed  Constitutional:       General: She is not in acute distress  Appearance: Normal appearance  She is normal weight  She is not ill-appearing, toxic-appearing or diaphoretic  HENT:      Head: Normocephalic and atraumatic  Cardiovascular:      Rate and Rhythm: Normal rate  Pulmonary:      Effort: Pulmonary effort is normal  No respiratory distress  Abdominal:      General: Abdomen is flat  There is no distension  Palpations: Abdomen is soft  Tenderness: There is no abdominal tenderness  There is no guarding or rebound  Skin:     General: Skin is warm  Capillary Refill: Capillary refill takes less than 2 seconds  Neurological:      General: No focal deficit present  Mental Status: She is alert and oriented to person, place, and time  Psychiatric:         Mood and Affect: Mood normal          Behavior: Behavior normal             Scheduled Meds:  Current Facility-Administered Medications   Medication Dose Route Frequency Provider Last Rate    amLODIPine  5 mg Oral Daily Alissa Rodrigues MD      escitalopram  20 mg Oral Daily Winter S Mark, CRNP      heparin (porcine)  5,000 Units Subcutaneous Q8H [address] Winter S Mark, CRNP      HYDROmorphone  0 5 mg Intravenous Q3H PRN Winter S Mark, CRNP      HYDROmorphone  1 mg Intravenous Q4H PRN Winter S Mark, CRNP      ibuprofen  200 mg Oral Q6H PRN Alissa Rodrigues MD      labetalol  10 mg Intravenous Q6H PRN Winter S Mark, CRNP      levothyroxine  75 mcg Oral Daily Alissa Rodrigues MD      ondansetron  4 mg Intravenous Q6H PRN Winter S Mark, IVANIANP      oxyCODONE  5 mg Oral Q6H PRN Alissa Rodrigues MD      pantoprazole  20 mg Oral BID Alissa Rodrigues MD      pantoprazole  40 mg Intravenous Q24H [address] Winter S Mark, CRNP      sodium chloride  125 mL/hr Intravenous Continuous Winter S Mark, CRNP 125 mL/hr (09/10/22 0511)     Continuous Infusions:sodium chloride, 125 mL/hr, Last Rate: 125 mL/hr (09/10/22 0511)      PRN Meds:   HYDROmorphone    HYDROmorphone    ibuprofen    labetalol    ondansetron    oxyCODONE      Lab, Imaging and other studies:  I have personally reviewed pertinent lab results    , CBC:   Lab Results   Component Value Date    WBC 4 67 09/10/2022    HGB 10 1 (L) 09/10/2022    HCT 32 0 (L) 09/10/2022    MCV 91 09/10/2022     09/10/2022    MCH 28 7 09/10/2022    MCHC 31 6 09/10/2022    RDW 14 1 09/10/2022    MPV 10 1 09/10/2022   , CMP:   Lab Results   Component Value Date    SODIUM 137 09/10/2022    K 3 9 09/10/2022     09/10/2022    CO2 21 09/10/2022    BUN 21 09/10/2022    CREATININE 1 41 (H) 09/10/2022    CALCIUM 7 9 (L) 09/10/2022     (H) 09/10/2022     (H) 09/10/2022    ALKPHOS 531 (H) 09/10/2022    EGFR 35 09/10/2022   , Coagulation: No results found for: PT, INR, APTT, Lipase:   Lab Results   Component Value Date    LIPASE 120 09/10/2022     VTE Pharmacologic Prophylaxis: Heparin  VTE Mechanical Prophylaxis: sequential compression device      Cate Jaquez PA-C  9/10/2022 10:02 AM

## 2022-09-10 NOTE — ASSESSMENT & PLAN NOTE
· Chronic pain syndrome involving lumbar spine  · Followed by pain management Dr Hola Tovar  · Hydrocodone b i d  P r n   Home regime  · PDMP reviewed- no red flags

## 2022-09-11 VITALS
HEIGHT: 63 IN | DIASTOLIC BLOOD PRESSURE: 75 MMHG | HEART RATE: 65 BPM | RESPIRATION RATE: 18 BRPM | SYSTOLIC BLOOD PRESSURE: 163 MMHG | WEIGHT: 205.25 LBS | TEMPERATURE: 98.4 F | BODY MASS INDEX: 36.37 KG/M2 | OXYGEN SATURATION: 95 %

## 2022-09-11 LAB
ALBUMIN SERPL BCP-MCNC: 2.6 G/DL (ref 3.5–5)
ALP SERPL-CCNC: 605 U/L (ref 46–116)
ALT SERPL W P-5'-P-CCNC: 199 U/L (ref 12–78)
ANION GAP SERPL CALCULATED.3IONS-SCNC: 9 MMOL/L (ref 4–13)
AST SERPL W P-5'-P-CCNC: 86 U/L (ref 5–45)
BILIRUB SERPL-MCNC: 2.21 MG/DL (ref 0.2–1)
BUN SERPL-MCNC: 20 MG/DL (ref 5–25)
CALCIUM ALBUM COR SERPL-MCNC: 9.1 MG/DL (ref 8.3–10.1)
CALCIUM SERPL-MCNC: 8 MG/DL (ref 8.3–10.1)
CHLORIDE SERPL-SCNC: 104 MMOL/L (ref 96–108)
CO2 SERPL-SCNC: 23 MMOL/L (ref 21–32)
CREAT SERPL-MCNC: 1.27 MG/DL (ref 0.6–1.3)
GFR SERPL CREATININE-BSD FRML MDRD: 40 ML/MIN/1.73SQ M
GLUCOSE SERPL-MCNC: 95 MG/DL (ref 65–140)
LIPASE SERPL-CCNC: 80 U/L (ref 73–393)
POTASSIUM SERPL-SCNC: 4 MMOL/L (ref 3.5–5.3)
PROT SERPL-MCNC: 5.7 G/DL (ref 6.4–8.4)
SODIUM SERPL-SCNC: 136 MMOL/L (ref 135–147)

## 2022-09-11 PROCEDURE — 80053 COMPREHEN METABOLIC PANEL: CPT | Performed by: FAMILY MEDICINE

## 2022-09-11 PROCEDURE — C9113 INJ PANTOPRAZOLE SODIUM, VIA: HCPCS | Performed by: NURSE PRACTITIONER

## 2022-09-11 PROCEDURE — 99239 HOSP IP/OBS DSCHRG MGMT >30: CPT | Performed by: FAMILY MEDICINE

## 2022-09-11 PROCEDURE — 83690 ASSAY OF LIPASE: CPT | Performed by: FAMILY MEDICINE

## 2022-09-11 RX ORDER — POLYETHYLENE GLYCOL 3350 17 G/17G
17 POWDER, FOR SOLUTION ORAL DAILY PRN
Status: DISCONTINUED | OUTPATIENT
Start: 2022-09-11 | End: 2022-09-11 | Stop reason: HOSPADM

## 2022-09-11 RX ORDER — VALSARTAN AND HYDROCHLOROTHIAZIDE 160; 12.5 MG/1; MG/1
1 TABLET, FILM COATED ORAL DAILY
Status: DISCONTINUED | OUTPATIENT
Start: 2022-09-11 | End: 2022-09-11 | Stop reason: HOSPADM

## 2022-09-11 RX ADMIN — POLYETHYLENE GLYCOL 3350 17 G: 17 POWDER, FOR SOLUTION ORAL at 12:56

## 2022-09-11 RX ADMIN — LEVOTHYROXINE SODIUM 75 MCG: 75 TABLET ORAL at 05:37

## 2022-09-11 RX ADMIN — ESCITALOPRAM OXALATE 20 MG: 10 TABLET ORAL at 08:39

## 2022-09-11 RX ADMIN — HEPARIN SODIUM 5000 UNITS: 5000 INJECTION INTRAVENOUS; SUBCUTANEOUS at 05:40

## 2022-09-11 RX ADMIN — PANTOPRAZOLE SODIUM 40 MG: 40 INJECTION, POWDER, FOR SOLUTION INTRAVENOUS at 08:39

## 2022-09-11 RX ADMIN — VALSARTAN AND HYDROCHLOROTHIAZIDE 1 TABLET: 160; 12.5 TABLET, FILM COATED ORAL at 12:56

## 2022-09-11 RX ADMIN — SODIUM CHLORIDE 75 ML/HR: 0.9 INJECTION, SOLUTION INTRAVENOUS at 01:09

## 2022-09-11 NOTE — ASSESSMENT & PLAN NOTE
· Chronic pain syndrome involving lumbar spine  · Followed by pain management Dr Henry Goldstein  · Hydrocodone b i d  P r n   Home regime  · PDMP reviewed- no red flags

## 2022-09-11 NOTE — ASSESSMENT & PLAN NOTE
· PTA valsartan/HCTZ  Currently on hold  Currently blood pressures are controlled    Restart valsartan and HCTZ

## 2022-09-11 NOTE — ASSESSMENT & PLAN NOTE
· History of mild bouts of abdominal pain at home however this time she had severe pain and came to the ED  · Transaminase and lipase elevation-remote cholecystectomy 1997   · Lipase 5360  · No history of alcohol abuse drug abuse medications that may cause pancreatitis, normal triglyceride level  Acute hepatitis panel pending  Unclear etiology of acute pancreatitis  · CT abdomen pelvis- Mild distention of intrahepatic bile ducts  CBD measures up to 16 mm  Acute pancreatitis and concern for diffuse pancreatic necrosis  MRCP shows Diffuse loss of normal MR signal of the pancreas without significant peripancreatic inflammation is 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) in this patient with elevated lipase  Of note, the absence of intravenous contrast limits assessment for necrosis, though the diagnosis is supported by the CT (contrast-enhanced) findings from one day prior  There are no acute necrotic collections  Biliary ductal dilatation can be attributable to the acute pancreatitis; no choledocholithiasis  Clinically improving pain is improving  Transaminitis and lipase level is improving however bilirubin is gradually trending up  Monitor for now and probably will require EUS outpatient  Patient has 2 first-degree relatives with diet of pancreatic cancer and her daughter was recently diagnosed with acute pancreatitis in her 62s with no obvious etiology as well  Tolerating clear liquid diet and advanced to soft diet today    If she tolerates it well will discharge home later today

## 2022-09-11 NOTE — PLAN OF CARE
Problem: Potential for Falls  Goal: Patient will remain free of falls  Description: INTERVENTIONS:  - Educate patient/family on patient safety including physical limitations  - Instruct patient to call for assistance with activity   - Consult OT/PT to assist with strengthening/mobility   - Keep Call bell within reach  - Keep bed low and locked with side rails adjusted as appropriate  - Keep care items and personal belongings within reach  - Initiate and maintain comfort rounds  - Make Fall Risk Sign visible to staff  - Offer Toileting every 2 Hours, in advance of need  - Initiate/Maintain bed and chair alarm  - Obtain necessary fall risk management equipment: walker  - Apply yellow socks and bracelet for high fall risk patients  - Consider moving patient to room near nurses station  Outcome: Progressing     Problem: MOBILITY - ADULT  Goal: Maintain or return to baseline ADL function  Description: INTERVENTIONS:  - Educate patient/family on patient safety including physical limitations  - Instruct patient to call for assistance with activity   - Consult OT/PT to assist with strengthening/mobility   - Keep Call bell within reach  - Keep bed low and locked with side rails adjusted as appropriate  - Keep care items and personal belongings within reach  - Initiate and maintain comfort rounds  - Make Fall Risk Sign visible to staff  - Offer Toileting every 2 Hours, in advance of need  - Initiate/Maintain bed and chair alarm  - Obtain necessary fall risk management equipment: walker  - Apply yellow socks and bracelet for high fall risk patients  - Consider moving patient to room near nurses station  Outcome: Progressing    Problem: PAIN - ADULT  Goal: Verbalizes/displays adequate comfort level or baseline comfort level  Description: Interventions:  - Encourage patient to monitor pain and request assistance  - Assess pain using appropriate pain scale  - Administer analgesics based on type and severity of pain and evaluate response  - Implement non-pharmacological measures as appropriate and evaluate response  - Consider cultural and social influences on pain and pain management  - Notify physician/advanced practitioner if interventions unsuccessful or patient reports new pain  Outcome: Progressing     Problem: INFECTION - ADULT  Goal: Absence or prevention of progression during hospitalization  Description: INTERVENTIONS:  - Assess and monitor for signs and symptoms of infection  - Monitor lab/diagnostic results  - Monitor all insertion sites, i e  indwelling lines, tubes, and drains  - Monitor endotracheal if appropriate and nasal secretions for changes in amount and color  - Potter appropriate cooling/warming therapies per order  - Administer medications as ordered  - Instruct and encourage patient and family to use good hand hygiene technique  - Identify and instruct in appropriate isolation precautions for identified infection/condition  Outcome: Progressing  Goal: Absence of fever/infection during neutropenic period  Description: INTERVENTIONS:  - Monitor WBC    Outcome: Progressing     Problem: SAFETY ADULT  Goal: Patient will remain free of falls  Description: INTERVENTIONS:  - Educate patient/family on patient safety including physical limitations  - Instruct patient to call for assistance with activity   - Consult OT/PT to assist with strengthening/mobility   - Keep Call bell within reach  - Keep bed low and locked with side rails adjusted as appropriate  - Keep care items and personal belongings within reach  - Initiate and maintain comfort rounds  - Make Fall Risk Sign visible to staff  - Offer Toileting every 2 Hours, in advance of need  - Initiate/Maintain bed and chair alarm  - Obtain necessary fall risk management equipment: walker  - Apply yellow socks and bracelet for high fall risk patients  - Consider moving patient to room near nurses station  Outcome: Progressing  Goal: Maintain or return to baseline ADL function  Description: INTERVENTIONS:  - Educate patient/family on patient safety including physical limitations  - Instruct patient to call for assistance with activity   - Consult OT/PT to assist with strengthening/mobility   - Keep Call bell within reach  - Keep bed low and locked with side rails adjusted as appropriate  - Keep care items and personal belongings within reach  - Initiate and maintain comfort rounds  - Make Fall Risk Sign visible to staff  - Offer Toileting every 2 Hours, in advance of need  - Initiate/Maintain bed and chair alarm  - Obtain necessary fall risk management equipment: walker  - Apply yellow socks and bracelet for high fall risk patients  - Consider moving patient to room near nurses station  Outcome: Progressing     Problem: DISCHARGE PLANNING  Goal: Discharge to home or other facility with appropriate resources  Description: INTERVENTIONS:  - Identify barriers to discharge w/patient and caregiver  - Arrange for needed discharge resources and transportation as appropriate  - Identify discharge learning needs (meds, wound care, etc )  - Arrange for interpretive services to assist at discharge as needed  - Refer to Case Management Department for coordinating discharge planning if the patient needs post-hospital services based on physician/advanced practitioner order or complex needs related to functional status, cognitive ability, or social support system  Outcome: Progressing     Problem: Knowledge Deficit  Goal: Patient/family/caregiver demonstrates understanding of disease process, treatment plan, medications, and discharge instructions  Description: Complete learning assessment and assess knowledge base    Interventions:  - Provide teaching at level of understanding  - Provide teaching via preferred learning methods  Outcome: Progressing     Problem: Nutrition/Hydration-ADULT  Goal: Nutrient/Hydration intake appropriate for improving, restoring or maintaining nutritional needs  Description: Monitor and assess patient's nutrition/hydration status for malnutrition  Collaborate with interdisciplinary team and initiate plan and interventions as ordered  Monitor patient's weight and dietary intake as ordered or per policy  Utilize nutrition screening tool and intervene as necessary  Determine patient's food preferences and provide high-protein, high-caloric foods as appropriate       INTERVENTIONS:  - Monitor oral intake, urinary output, labs, and treatment plans  - Assess nutrition and hydration status and recommend course of action  - Evaluate amount of meals eaten  - Assist patient with eating if necessary   - Allow adequate time for meals  - Recommend/ encourage appropriate diets, oral nutritional supplements, and vitamin/mineral supplements  - Order, calculate, and assess calorie counts as needed  - Recommend, monitor, and adjust tube feedings and TPN/PPN based on assessed needs  - Assess need for intravenous fluids  - Provide specific nutrition/hydration education as appropriate  - Include patient/family/caregiver in decisions related to nutrition  Outcome: Progressing     Problem: METABOLIC, FLUID AND ELECTROLYTES - ADULT  Goal: Electrolytes maintained within normal limits  Description: INTERVENTIONS:  - Monitor labs and assess patient for signs and symptoms of electrolyte imbalances  - Administer electrolyte replacement as ordered  - Monitor response to electrolyte replacements, including repeat lab results as appropriate  - Instruct patient on fluid and nutrition as appropriate  Outcome: Progressing  Goal: Fluid balance maintained  Description: INTERVENTIONS:  - Monitor labs   - Monitor I/O and WT  - Instruct patient on fluid and nutrition as appropriate  - Assess for signs & symptoms of volume excess or deficit  Outcome: Progressing     Problem: Prexisting or High Potential for Compromised Skin Integrity  Goal: Skin integrity is maintained or improved  Description: INTERVENTIONS:  - Identify patients at risk for skin breakdown  - Assess and monitor skin integrity  - Assess and monitor nutrition and hydration status  - Monitor labs   - Assess for incontinence   - Turn and reposition patient  - Assist with mobility/ambulation  - Relieve pressure over bony prominences  - Avoid friction and shearing  - Provide appropriate hygiene as needed including keeping skin clean and dry  - Evaluate need for skin moisturizer/barrier cream  - Collaborate with interdisciplinary team   - Patient/family teaching  - Consider wound care consult   Outcome: Progressing

## 2022-09-11 NOTE — DISCHARGE SUMMARY
[address]  Discharge- Liu Garcia 1945, 65 y o  female MRN: [id number]  Unit/Bed#: -01 Encounter: [id number]  Primary Care Provider: Ct Kinsey MD   Date and time admitted to hospital: 9/8/2022  8:47 PM    * Acute pancreatitis  Assessment & Plan  · History of mild bouts of abdominal pain at home however this time she had severe pain and came to the ED  · Transaminase and lipase elevation-remote cholecystectomy 1997   · Lipase 5360  · No history of alcohol abuse drug abuse medications that may cause pancreatitis, normal triglyceride level  Acute hepatitis panel pending  Unclear etiology of acute pancreatitis  · CT abdomen pelvis- Mild distention of intrahepatic bile ducts  CBD measures up to 16 mm  Acute pancreatitis and concern for diffuse pancreatic necrosis  MRCP shows Diffuse loss of normal MR signal of the pancreas without significant peripancreatic inflammation is 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) in this patient with elevated lipase  Of note, the absence of intravenous contrast limits assessment for necrosis, though the diagnosis is supported by the CT (contrast-enhanced) findings from one day prior  There are no acute necrotic collections  Biliary ductal dilatation can be attributable to the acute pancreatitis; no choledocholithiasis  Clinically improving pain is improving  Transaminitis and lipase level is improving however bilirubin is gradually trending up  Monitor for now and probably will require EUS outpatient  Patient has 2 first-degree relatives with diet of pancreatic cancer and her daughter was recently diagnosed with acute pancreatitis in her 62s with no obvious etiology as well  Tolerating clear liquid diet and advanced to soft diet today    If she tolerates it well will discharge home later today    Hx of Acquired gastric fistula  Assessment & Plan  · s/p ORYGBP with known gastric-gastric fistula diagnosed 2013  · Followed by bariatric surgery at [facility]  · Continue PPI      Chronic pain syndrome  Assessment & Plan  · Chronic pain syndrome involving lumbar spine  · Followed by pain management Dr Hola Tovar  · Hydrocodone b i d  P r n  Home regime  · PDMP reviewed- no red flags    Acquired hypothyroidism  Assessment & Plan  · TSH and free T4 level abnormal however okay to continue home regimen of levothyroxine  · Resume levothyroxine 75 mcg daily    CKD (chronic kidney disease) stage 3, GFR 30-59 ml/min [facility])  Assessment & Plan  Lab Results   Component Value Date    EGFR 40 09/11/2022    EGFR 35 09/10/2022    EGFR 36 09/09/2022    CREATININE 1 27 09/11/2022    CREATININE 1 41 (H) 09/10/2022    CREATININE 1 39 (H) 09/09/2022   · Creatinine 1 31 on admission appears baseline  · Trend creatinine  Renally dose medications  · CT- Left renal 2 2 cm cyst   Symmetric nephrographic phase enhancement of the kidneys  No obstructive uropathy  Primary hypertension  Assessment & Plan  · PTA valsartan/HCTZ  Currently on hold  Currently blood pressures are controlled  Restart valsartan and HCTZ      Discharging Physician / Practitioner: Kristen Laura MD  PCP: Cesar Fabian MD  Admission Date:   Admission Orders (From admission, onward)     Ordered        09/09/22 0010  INPATIENT ADMISSION  Once                      Discharge Date: 09/11/22    Medical Problems             Resolved Problems  Date Reviewed: 9/11/2022   None                 Consultations During Hospital Stay:  · GI    Procedures Performed:   · None    Significant Findings / Test Results:   MRI abdomen wo contrast and mrcp    Result Date: 9/9/2022  Impression: Diffuse loss of normal MR signal of the pancreas without significant peripancreatic inflammation is 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) in this patient with elevated lipase    Of note, the absence of intravenous contrast limits assessment for necrosis, though the diagnosis is supported by the CT (contrast-enhanced) findings from one day prior  There are no acute necrotic collections  Biliary ductal dilatation can be attributable to the acute pancreatitis; no choledocholithiasis  Workstation performed: [id number]     CT abdomen pelvis with contrast    Result Date: 9/9/2022  Impression: 1  Findings consistent with acute pancreatitis and concern for diffuse pancreatic necrosis  2   Dilatation of intrahepatic and common bile ducts, and excess of expected postsurgical change from cholecystectomy, possibly secondary to acute pancreatitis  MRCP may be helpful for further evaluation  Workstation performed: [id number]     Incidental Findings:   · None    Test Results Pending at Discharge (will require follow up): · None     Outpatient Tests Requested:  · CMP in 2 weeks  · Outpatient follow-up with GI for possible EUS    Complications:  None    Reason for Admission:  Abdominal pain    Hospital Course:     Travis Etienne is a 65 y o  female patient who originally presented to the hospital on 9/8/2022 due to acute necrotizing pancreatitis with transaminitis  Also slight elevation of bilirubin noted CT showed biliary ductal dilatation however MRCP did not show any choledocholithiasis  Patient has prior history cholecystectomy and gastric bypass surgery  Has had 2 family members diagnosed pancreatic cancer and hence it is concerning why she developed this episode as a clear etiology could not be found  Denies any alcohol abuse or recreational substance abuse negative acute hepatitis panel and normal lipids  Discharge home today with outpatient follow-up with GI and assess for possible EUS/edge procedure by advanced GI  Also recommend repeat MRI MRCP with and without contrast in 2 months to assess for underlying pancreatic lesion    Please see above list of diagnoses and related plan for additional information       Condition at Discharge: good     Discharge Day Visit / Exam: Subjective:  Patient denies any chest pain or shortness of breath or abdominal pain today  Feels much better denies any nausea vomiting  Vitals: Blood Pressure: 163/75 (09/11/22 0741)  Pulse: 65 (09/11/22 0741)  Temperature: 98 4 °F (36 9 °C) (09/11/22 0741)  Temp Source: Temporal (09/08/22 2051)  Respirations: 18 (09/11/22 0741)  Height: 5' 3" (160 cm) (09/08/22 2051)  Weight - Scale: 93 1 kg (205 lb 4 oz) (09/09/22 0049)  SpO2: 96 % (09/11/22 0741)  Exam:   Physical Exam  Vitals and nursing note reviewed  Constitutional:       Appearance: Normal appearance  HENT:      Head: Normocephalic and atraumatic  Right Ear: External ear normal       Left Ear: External ear normal       Nose: Nose normal       Mouth/Throat:      Pharynx: Oropharynx is clear  Cardiovascular:      Rate and Rhythm: Normal rate and regular rhythm  Heart sounds: Normal heart sounds  Pulmonary:      Effort: Pulmonary effort is normal       Breath sounds: Normal breath sounds  Abdominal:      General: Bowel sounds are normal       Palpations: Abdomen is soft  Tenderness: There is no abdominal tenderness  Musculoskeletal:         General: Normal range of motion  Cervical back: Normal range of motion and neck supple  Skin:     General: Skin is warm and dry  Capillary Refill: Capillary refill takes less than 2 seconds  Neurological:      General: No focal deficit present  Mental Status: She is alert and oriented to person, place, and time  Psychiatric:         Mood and Affect: Mood normal          Discussion with Family:  Update     Discharge instructions/Information to patient and family:   See after visit summary for information provided to patient and family  Provisions for Follow-Up Care:  See after visit summary for information related to follow-up care and any pertinent home health orders        Disposition:     Home    For Discharges to [facility]F:   · Not Applicable to this Patient - Not Applicable to this Patient    Planned Readmission: none     Discharge Statement:  I spent 35 minutes discharging the patient  This time was spent on the day of discharge  I had direct contact with the patient on the day of discharge  Greater than 50% of the total time was spent examining patient, answering all patient questions, arranging and discussing plan of care with patient as well as directly providing post-discharge instructions  Additional time then spent on discharge activities  Discharge Medications:  See after visit summary for reconciled discharge medications provided to patient and family        ** Please Note: This note has been constructed using a voice recognition system **

## 2022-09-11 NOTE — PLAN OF CARE
Problem: Potential for Falls  Goal: Patient will remain free of falls  Description: INTERVENTIONS:  - Educate patient/family on patient safety including physical limitations  - Instruct patient to call for assistance with activity   - Consult OT/PT to assist with strengthening/mobility   - Keep Call bell within reach  - Keep bed low and locked with side rails adjusted as appropriate  - Keep care items and personal belongings within reach  - Initiate and maintain comfort rounds  - Make Fall Risk Sign visible to staff  - Offer Toileting every 2 Hours, in advance of need  - Initiate/Maintain bed and chair alarm  - Obtain necessary fall risk management equipment: walker  - Apply yellow socks and bracelet for high fall risk patients  - Consider moving patient to room near nurses station  Outcome: Progressing     Problem: MOBILITY - ADULT  Goal: Maintain or return to baseline ADL function  Description: INTERVENTIONS:  - Educate patient/family on patient safety including physical limitations  - Instruct patient to call for assistance with activity   - Consult OT/PT to assist with strengthening/mobility   - Keep Call bell within reach  - Keep bed low and locked with side rails adjusted as appropriate  - Keep care items and personal belongings within reach  - Initiate and maintain comfort rounds  - Make Fall Risk Sign visible to staff  - Offer Toileting every 2 Hours, in advance of need  - Initiate/Maintain bed and chair alarm  - Obtain necessary fall risk management equipment: walker  - Apply yellow socks and bracelet for high fall risk patients  - Consider moving patient to room near nurses station  Outcome: Progressing  Goal: Maintains/Returns to pre admission functional level  Description: INTERVENTIONS:  - Perform BMAT or MOVE assessment daily    - Set and communicate daily mobility goal to care team and patient/family/caregiver     - Collaborate with rehabilitation services on mobility goals if consulted    - Out of bed for toileting  - Record patient progress and toleration of activity level   Outcome: Progressing     Problem: PAIN - ADULT  Goal: Verbalizes/displays adequate comfort level or baseline comfort level  Description: Interventions:  - Encourage patient to monitor pain and request assistance  - Assess pain using appropriate pain scale  - Administer analgesics based on type and severity of pain and evaluate response  - Implement non-pharmacological measures as appropriate and evaluate response  - Consider cultural and social influences on pain and pain management  - Notify physician/advanced practitioner if interventions unsuccessful or patient reports new pain  Outcome: Progressing     Problem: INFECTION - ADULT  Goal: Absence or prevention of progression during hospitalization  Description: INTERVENTIONS:  - Assess and monitor for signs and symptoms of infection  - Monitor lab/diagnostic results  - Monitor all insertion sites, i e  indwelling lines, tubes, and drains  - Monitor endotracheal if appropriate and nasal secretions for changes in amount and color  - [place] appropriate cooling/warming therapies per order  - Administer medications as ordered  - Instruct and encourage patient and family to use good hand hygiene technique  - Identify and instruct in appropriate isolation precautions for identified infection/condition  Outcome: Progressing  Goal: Absence of fever/infection during neutropenic period  Description: INTERVENTIONS:  - Monitor WBC    Outcome: Progressing     Problem: SAFETY ADULT  Goal: Patient will remain free of falls  Description: INTERVENTIONS:  - Educate patient/family on patient safety including physical limitations  - Instruct patient to call for assistance with activity   - Consult OT/PT to assist with strengthening/mobility   - Keep Call bell within reach  - Keep bed low and locked with side rails adjusted as appropriate  - Keep care items and personal belongings within reach  - Initiate and maintain comfort rounds  - Make Fall Risk Sign visible to staff  - Offer Toileting every 2 Hours, in advance of need  - Initiate/Maintain bed and chair alarm  - Obtain necessary fall risk management equipment: walker  - Apply yellow socks and bracelet for high fall risk patients  - Consider moving patient to room near nurses station  Outcome: Progressing  Goal: Maintain or return to baseline ADL function  Description: INTERVENTIONS:  - Educate patient/family on patient safety including physical limitations  - Instruct patient to call for assistance with activity   - Consult OT/PT to assist with strengthening/mobility   - Keep Call bell within reach  - Keep bed low and locked with side rails adjusted as appropriate  - Keep care items and personal belongings within reach  - Initiate and maintain comfort rounds  - Make Fall Risk Sign visible to staff  - Offer Toileting every 2 Hours, in advance of need  - Initiate/Maintain bed and chair alarm  - Obtain necessary fall risk management equipment: walker  - Apply yellow socks and bracelet for high fall risk patients  - Consider moving patient to room near nurses station  Outcome: Progressing  Goal: Maintains/Returns to pre admission functional level  Description: INTERVENTIONS:  - Perform BMAT or MOVE assessment daily    - Set and communicate daily mobility goal to care team and patient/family/caregiver     - Collaborate with rehabilitation services on mobility goals if consulted    - Out of bed for toileting  - Record patient progress and toleration of activity level   Outcome: Progressing     Problem: DISCHARGE PLANNING  Goal: Discharge to home or other facility with appropriate resources  Description: INTERVENTIONS:  - Identify barriers to discharge w/patient and caregiver  - Arrange for needed discharge resources and transportation as appropriate  - Identify discharge learning needs (meds, wound care, etc )  - Arrange for interpretive services to assist at discharge as needed  - Refer to Case Management Department for coordinating discharge planning if the patient needs post-hospital services based on physician/advanced practitioner order or complex needs related to functional status, cognitive ability, or social support system  Outcome: Progressing     Problem: Knowledge Deficit  Goal: Patient/family/caregiver demonstrates understanding of disease process, treatment plan, medications, and discharge instructions  Description: Complete learning assessment and assess knowledge base  Interventions:  - Provide teaching at level of understanding  - Provide teaching via preferred learning methods  Outcome: Progressing     Problem: Nutrition/Hydration-ADULT  Goal: Nutrient/Hydration intake appropriate for improving, restoring or maintaining nutritional needs  Description: Monitor and assess patient's nutrition/hydration status for malnutrition  Collaborate with interdisciplinary team and initiate plan and interventions as ordered  Monitor patient's weight and dietary intake as ordered or per policy  Utilize nutrition screening tool and intervene as necessary  Determine patient's food preferences and provide high-protein, high-caloric foods as appropriate       INTERVENTIONS:  - Monitor oral intake, urinary output, labs, and treatment plans  - Assess nutrition and hydration status and recommend course of action  - Evaluate amount of meals eaten  - Assist patient with eating if necessary   - Allow adequate time for meals  - Recommend/ encourage appropriate diets, oral nutritional supplements, and vitamin/mineral supplements  - Order, calculate, and assess calorie counts as needed  - Recommend, monitor, and adjust tube feedings and TPN/PPN based on assessed needs  - Assess need for intravenous fluids  - Provide specific nutrition/hydration education as appropriate  - Include patient/family/caregiver in decisions related to nutrition  Outcome: Progressing     Problem: METABOLIC, FLUID AND ELECTROLYTES - ADULT  Goal: Electrolytes maintained within normal limits  Description: INTERVENTIONS:  - Monitor labs and assess patient for signs and symptoms of electrolyte imbalances  - Administer electrolyte replacement as ordered  - Monitor response to electrolyte replacements, including repeat lab results as appropriate  - Instruct patient on fluid and nutrition as appropriate  Outcome: Progressing  Goal: Fluid balance maintained  Description: INTERVENTIONS:  - Monitor labs   - Monitor I/O and WT  - Instruct patient on fluid and nutrition as appropriate  - Assess for signs & symptoms of volume excess or deficit  Outcome: Progressing     Problem: Prexisting or High Potential for Compromised Skin Integrity  Goal: Skin integrity is maintained or improved  Description: INTERVENTIONS:  - Identify patients at risk for skin breakdown  - Assess and monitor skin integrity  - Assess and monitor nutrition and hydration status  - Monitor labs   - Assess for incontinence   - Turn and reposition patient  - Assist with mobility/ambulation  - Relieve pressure over bony prominences  - Avoid friction and shearing  - Provide appropriate hygiene as needed including keeping skin clean and dry  - Evaluate need for skin moisturizer/barrier cream  - Collaborate with interdisciplinary team   - Patient/family teaching  - Consider wound care consult   Outcome: Progressing

## 2022-09-11 NOTE — ASSESSMENT & PLAN NOTE
Lab Results   Component Value Date    EGFR 40 09/11/2022    EGFR 35 09/10/2022    EGFR 36 09/09/2022    CREATININE 1 27 09/11/2022    CREATININE 1 41 (H) 09/10/2022    CREATININE 1 39 (H) 09/09/2022   · Creatinine 1 31 on admission appears baseline  · Trend creatinine  Renally dose medications  · CT- Left renal 2 2 cm cyst   Symmetric nephrographic phase enhancement of the kidneys  No obstructive uropathy

## 2022-09-11 NOTE — ASSESSMENT & PLAN NOTE
· s/p ORYGBP with known gastric-gastric fistula diagnosed 2013  · Followed by bariatric surgery at [facility]  · Continue PPI

## 2022-09-11 NOTE — PROGRESS NOTES
Progress Note - General Surgery   Shant Hahn 65 y o  female MRN: [id number]  Unit/Bed#: -01 Encounter: [id number]    Assessment:  66 yo F with acute pancreatitis with CT findings c/f necrotizing pancreatitis  Hx of cholecystectomy in the 90s  TG nl  Denies ETOH use  AVSS  LFTs cont to improve  Tbili 2 2 (4 2, 3 2)  MRI negative for CBD stone  Lipase 80 (120, 1700)    Plan:  - no acute surgical intervention warranted at this time  - advance to FLD t/c  - dvt ppx  - prn pian, antiemetic regimen  - f/u as outpt    Rest of care per primary    Subjective/Objective     Subjective: doing well, tolerated CLD yesterday without abd pain, n/v       Objective:     Blood pressure 163/75, pulse 65, temperature 98 4 °F (36 9 °C), resp  rate 18, height 5' 3" (1 6 m), weight 93 1 kg (205 lb 4 oz), SpO2 96 %  ,Body mass index is 36 36 kg/m²  Intake/Output Summary (Last 24 hours) at 9/11/2022 0850  Last data filed at 9/11/2022 0529  Gross per 24 hour   Intake 1955 ml   Output 750 ml   Net 1205 ml       Invasive Devices  Report    Peripheral Intravenous Line  Duration           Peripheral IV 09/11/22 Dorsal (posterior); Right Wrist <1 day                Physical Exam  Vitals and nursing note reviewed  Constitutional:       General: She is not in acute distress  Appearance: Normal appearance  She is normal weight  She is not ill-appearing, toxic-appearing or diaphoretic  HENT:      Head: Normocephalic and atraumatic  Eyes:      Extraocular Movements: Extraocular movements intact  Cardiovascular:      Rate and Rhythm: Normal rate  Pulmonary:      Effort: Pulmonary effort is normal  No respiratory distress  Abdominal:      General: Abdomen is flat  There is no distension  Palpations: Abdomen is soft  Tenderness: There is no abdominal tenderness  There is no guarding or rebound  Skin:     General: Skin is warm  Capillary Refill: Capillary refill takes less than 2 seconds     Neurological: General: No focal deficit present  Mental Status: She is alert and oriented to person, place, and time  Psychiatric:         Mood and Affect: Mood normal          Behavior: Behavior normal             Scheduled Meds:  Current Facility-Administered Medications   Medication Dose Route Frequency Provider Last Rate    amLODIPine  5 mg Oral Daily Remo Nyhan, MD      escitalopram  20 mg Oral Daily Winter S Mark, CRNP      heparin (porcine)  5,000 Units Subcutaneous Q8H [address] Winter S Mark, CRNP      HYDROmorphone  0 5 mg Intravenous Q3H PRN Winter S Mark, CRNP      ibuprofen  200 mg Oral Q6H PRN Remo Nyhan, MD      levothyroxine  75 mcg Oral Early Morning Remo Nyhan, MD      ondansetron  4 mg Intravenous Q6H PRN Winter S Mark, CRNP      oxyCODONE  5 mg Oral Q6H PRN Remo Nyhan, MD      pantoprazole  40 mg Intravenous Q24H [address] Winter S Mark, CRNP      sodium chloride  75 mL/hr Intravenous Continuous Remo Nyhan, MD 75 mL/hr (09/11/22 0109)     Continuous Infusions:sodium chloride, 75 mL/hr, Last Rate: 75 mL/hr (09/11/22 0109)      PRN Meds:   HYDROmorphone    ibuprofen    ondansetron    oxyCODONE      Lab, Imaging and other studies:  I have personally reviewed pertinent lab results    , CBC: No results found for: WBC, HGB, HCT, MCV, PLT, ADJUSTEDWBC, MCH, MCHC, RDW, MPV, NRBC, CMP:   Lab Results   Component Value Date    SODIUM 136 09/11/2022    K 4 0 09/11/2022     09/11/2022    CO2 23 09/11/2022    BUN 20 09/11/2022    CREATININE 1 27 09/11/2022    CALCIUM 8 0 (L) 09/11/2022    AST 86 (H) 09/11/2022     (H) 09/11/2022    ALKPHOS 605 (H) 09/11/2022    EGFR 40 09/11/2022   , Coagulation: No results found for: PT, INR, APTT, Urinalysis: No results found for: COLORU, CLARITYU, SPECGRAV, PHUR, LEUKOCYTESUR, NITRITE, PROTEINUA, GLUCOSEU, KETONESU, BILIRUBINUR, BLOODU, Lipase:   Lab Results   Component Value Date    LIPASE 80 09/11/2022     VTE Pharmacologic Prophylaxis: Heparin  VTE Mechanical Prophylaxis: sequential compression device      Erika Mobley PA-C  9/11/2022 8:50 AM

## 2022-09-12 ENCOUNTER — TELEPHONE (OUTPATIENT)
Dept: OTHER | Facility: OTHER | Age: 77
End: 2022-09-12

## 2022-09-12 DIAGNOSIS — K85.81 OTHER ACUTE PANCREATITIS WITH UNINFECTED NECROSIS: Primary | ICD-10-CM

## 2022-09-12 NOTE — TELEPHONE ENCOUNTER
Patient calling stating she had seen Dr Stacey Frey in the hospital, has a referral and is for 09/18, which is a Sunday for EUS and wants to know if this is correct due to the date, appears to be a consult referral  Please give patient a call back @ [phone]

## 2022-09-13 ENCOUNTER — TELEPHONE (OUTPATIENT)
Dept: GASTROENTEROLOGY | Facility: CLINIC | Age: 77
End: 2022-09-13

## 2022-09-13 NOTE — TELEPHONE ENCOUNTER
Pt was hospitalized from 9/8-9/11 for abdominal pain  CT showed findings consistent with acute pancreatitis and concern for diffuse pancreatic necrosis  Dilatation of intrahepatic and common bile ducts, and excess of expected postsurgical change from cholecystectomy, possibly secondary to acute pancreatitis  MRI/MRCP showed Diffuse loss of normal MR signal of the pancreas without significant peripancreatic inflammation concerning for necrotizing pancreatitis  There are no acute necrotic collections  Biliary ductal dilatation can be attributable to the acute pancreatitis; no choledocholithiasis  Per GI (Dr Gabriela Horne) Acute necrotizing pancreatitis, Elevated liver enzymes,  Dilated common bile duct  It was advised that given her RYGB status performing an EUS or ERCP will be more challenging and would require laparoscopy assistance versus an EDGE procedure by advanced GI therefore would recommend repeat MRI/MRCP with and without contrast in 2 months to assess for underlying pancreas lesion  However, if patient clinically deteriorates or liver enzymes do not decrease appropriately or continue to rise  would recommend transfer to a facility with a have bariatric surgery and advanced GI services    LFT's on 9/11 have somewhat improved     A referral by [facility] med/internal med was placed stating Needs eus or edge procedure    Admitted for acute necrotizing pancreatitis with biliary ductal dilatation

## 2022-09-13 NOTE — TELEPHONE ENCOUNTER
The pt is calling to set up EUS, however, unsure how to proceed  Should pt be scheduled for EUS automatically? Or should pt be seen for OV and further dicussed? Should pt await repeat MRI in 2 months?

## 2022-09-15 NOTE — TELEPHONE ENCOUNTER
MRI/MRCP order placed, pt instructed to call central scheduling and schedule in one month     Pt needs OV soon

## 2022-09-21 NOTE — TELEPHONE ENCOUNTER
Patient was given the number for central scheduling and reminded to schedule in October, Patient is also scheduled for a follow up visit with Dr Augusto Boyle in the [facility] office on 11/16/22

## 2022-10-04 ENCOUNTER — HOSPITAL ENCOUNTER (OUTPATIENT)
Dept: MRI IMAGING | Facility: HOSPITAL | Age: 77
Discharge: HOME | End: 2022-10-04
Payer: MEDICARE

## 2022-10-04 DIAGNOSIS — K85.81 OTHER ACUTE PANCREATITIS WITH UNINFECTED NECROSIS: ICD-10-CM

## 2022-10-04 PROCEDURE — G1004 CDSM NDSC: HCPCS

## 2022-10-04 PROCEDURE — 74183 MRI ABD W/O CNTR FLWD CNTR: CPT

## 2022-10-04 PROCEDURE — A9585 GADOBUTROL INJECTION: HCPCS | Performed by: INTERNAL MEDICINE

## 2022-10-04 RX ADMIN — GADOBUTROL 9 ML: 604.72 INJECTION INTRAVENOUS at 12:38

## 2022-10-10 ENCOUNTER — TELEPHONE (OUTPATIENT)
Dept: GASTROENTEROLOGY | Facility: CLINIC | Age: 77
End: 2022-10-10

## 2022-10-10 NOTE — TELEPHONE ENCOUNTER
Good morning, [facility] called and stated there were significant findings on MRI of Abdomen for patient Elaine Nuñez  1945 tiger text ERLINDA Viveros as well

## 2022-10-25 ENCOUNTER — TELEPHONE (OUTPATIENT)
Dept: GASTROENTEROLOGY | Facility: CLINIC | Age: 77
End: 2022-10-25

## 2022-10-25 ENCOUNTER — TELEPHONE (OUTPATIENT)
Dept: OTHER | Facility: OTHER | Age: 77
End: 2022-10-25

## 2022-10-25 NOTE — TELEPHONE ENCOUNTER
----- Message from Leopold Lair, MD sent at 10/24/2022 11:09 AM EDT -----  Please call patient :  The MRI showed no concerning areas but there was inflammation seen which may at times hamper the visualization of small lesions  I would suggest following pu with us in office  She is scheduled for Nov and I would like the appointment to be moved to this week  Thank you     Chetan Solo

## 2022-10-25 NOTE — TELEPHONE ENCOUNTER
Spoke with pt and relayed MRI results   Pt appt changed form 11/16 to 10/27 as per Dr Marissa Betancourt

## 2022-10-27 ENCOUNTER — OFFICE VISIT (OUTPATIENT)
Dept: GASTROENTEROLOGY | Facility: MEDICAL CENTER | Age: 77
End: 2022-10-27

## 2022-10-27 VITALS
HEART RATE: 65 BPM | BODY MASS INDEX: 35.71 KG/M2 | TEMPERATURE: 97.6 F | WEIGHT: 201.6 LBS | DIASTOLIC BLOOD PRESSURE: 83 MMHG | SYSTOLIC BLOOD PRESSURE: 139 MMHG

## 2022-10-27 DIAGNOSIS — K31.6 ACQUIRED GASTRIC FISTULA: Primary | ICD-10-CM

## 2022-10-27 DIAGNOSIS — K85.00 IDIOPATHIC ACUTE PANCREATITIS WITHOUT INFECTION OR NECROSIS: ICD-10-CM

## 2022-10-27 DIAGNOSIS — R79.89 ELEVATED LFTS: ICD-10-CM

## 2022-10-27 RX ORDER — OMEGA-3S/DHA/EPA/FISH OIL/D3 300MG-1000
400 CAPSULE ORAL DAILY
COMMUNITY

## 2022-10-27 NOTE — PROGRESS NOTES
Outpatient Consultation  [address]  [address]  [facility] [address]  Zayra LATHAM  Ph : [phone]  Fax : [phone]  Mobile : [phone]  Email : [email or web address]  Also available on Tiger Text      Samantha Moran 65 y o  female MRN: [id number]    PCP: Ted Amaral MD  Referring: No referring provider defined for this encounter  Samantha Moran was seen in consultation  My recommendations are included  Please do not hesitate to contact me with any questions you may have  ASSESSMENT AND PLAN:      No problem-specific Assessment & Plan notes found for this encounter  Diagnoses and all orders for this visit:    Hx of Acquired gastric fistula  -     FL upper GI UGI; Future    Idiopathic acute pancreatitis without infection or necrosis  -     Comprehensive metabolic panel; Future  -     IgG 1, 2, 3, and 4; Future  -     Bilirubin, direct; Future  -     Lipase; Future  -     MRI abdomen w wo contrast and mrcp; Future    Elevated LFTs  -     Comprehensive metabolic panel; Future  -     IgG 1, 2, 3, and 4; Future  -     Bilirubin, direct; Future  -     Lipase; Future    Other orders  -     cholecalciferol (VITAMIN D3) 400 units tablet; Take 400 Units by mouth daily      Jason Cardoza is a 18-year-old lady who presents to us for evaluation for recent episode of necrotizing pancreatitis and elevated LFTs  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  She has a history of a cholecystectomy  No etiology was found regarding her pancreatitis  Also appears that she was not extremely ill with her necrotizing pancreatitis which is surprising  She does have a family history of pancreatic cancer  At this time for further evaluation of recommend checking for autoimmune pancreatitis    I also discussed with her regarding doing endoscopic ultrasound however this will be challenging based on the fact that she has the gastric bypass  I discussed the possibility of edge however she would like to hold off on that  I also discussed with her that on imaging she does have a gastrogastric fistula and this is still patent then we would be able to go through that and place a lumen opposing stent dilate the orifice and then do the endoscopic ultrasound through that area  She will consider it  Follow up lipase, CMP levels  I would also like to follow-up with an MRI in the next few months   ______________________________________________________________________    HPI:  66 y/o lady who presents with h/o gastric bypass done in 1997  She presents for evaluation of pancreatitis  She had onset of pain in 9/8/22 and had severe pain  She went to the ER and had elevated LFT's and lipase  The lipase improved but the LFT's were still high  She was tretaed with liquids/ IVF  Now sometimes she eats somethings and has pain / dysphagia  Avoiding fatty foods  Mostly chicken / meats  Sometimes chicken or bread may get stuck at the bottom of the esophagus  She has had EGD's done sometime ago  At least 8 years ago  This could be due to nausea/ pain  She had a gastrogastric fistula  She was put on protonix and states that it had healed  Nausea  No vomiting  She does have constipation  She takes milk of magnesia  No recent antibiotics  No new medications  She takes hydrocodone for her back  She has been on protonix and carafate  She did not get any vaccinations  No recent infections  CT 2018 : Impression:   1  Oral contrast opacifies the excluded stomach, of uncertain significance  2  Hypodense pancreas without significant adjacent inflammation  Correlate for   pancreatitis  EGD : small fistula (unclear if it's going to the stomach or blind) at the [address] [place] [address] anastomosis area  She states that this was treated about 8 years ago  No surgery was done but endoscopic therapy was provided    It appears that it was still patent after that  Grandfather and aunt : pancreatic cancer  Aunt with ovarian cancer  Uncles with prostate cancer  PRIOR ENDOSCOPIC EVALUATION :     Endoscopy : yes    Colonoscopy : yes      REVIEW OF SYSTEMS:    CONSTITUTIONAL: Denies any fever, chills, rigors, and weight loss  HEENT: No earache or tinnitus  Denies hearing loss or visual disturbances  CARDIOVASCULAR: No chest pain or palpitations  RESPIRATORY: Denies any cough, hemoptysis, shortness of breath or dyspnea on exertion  GASTROINTESTINAL: As noted in the History of Present Illness  GENITOURINARY: No problems with urination  Denies any hematuria or dysuria  NEUROLOGIC: No dizziness or vertigo, denies headaches  MUSCULOSKELETAL: Denies any muscle or joint pain  SKIN: Denies skin rashes or itching  ENDOCRINE: Denies excessive thirst  Denies intolerance to heat or cold  PSYCHOSOCIAL: Denies depression or anxiety  Denies any recent memory loss         Historical Information   Past Medical History:   Diagnosis Date   • Disease of thyroid gland    • GERD (gastroesophageal reflux disease)    • Hypertension      Past Surgical History:   Procedure Laterality Date   • HYSTERECTOMY     • REDUCTION MAMMAPLASTY Bilateral    • KAREEM-EN-Y PROCEDURE      open bypass and jeff Dr Stacie Gamez   • TONSILLECTOMY       Social History   Social History     Substance and Sexual Activity   Alcohol Use Not Currently     Social History     Substance and Sexual Activity   Drug Use Never     Social History     Tobacco Use   Smoking Status Never Smoker   Smokeless Tobacco Never Used     Family History   Problem Relation Age of Onset   • Hypertension Mother    • Heart disease Mother    • COPD Mother    • Deep vein thrombosis Mother        Meds/Allergies       Current Outpatient Medications:   •  Biotin 1 MG CAPS  •  cholecalciferol (VITAMIN D3) 400 units tablet  •  ergocalciferol (ERGOCALCIFEROL) 1 25 MG (38620 UT) capsule  •  escitalopram (LEXAPRO) 20 mg tablet  •  HYDROcodone-acetaminophen (NORCO) 5-325 mg per tablet  •  levothyroxine 75 mcg tablet  •  Pantoprazole Sodium (PROTONIX PO)  •  valsartan 160 mg TABS 160 mg, hydrochlorothiazide 12 5 mg TABS 12 5 mg    No Known Allergies        Objective     Blood pressure 139/83, pulse 65, temperature 97 6 °F (36 4 °C), temperature source Tympanic, weight 91 4 kg (201 lb 9 6 oz)  Body mass index is 35 71 kg/m²  PHYSICAL EXAM:      Physical Exam  Constitutional:       Appearance: Normal appearance  She is well-developed  HENT:      Head: Normocephalic and atraumatic  Eyes:      General: No scleral icterus  Conjunctiva/sclera: Conjunctivae normal       Pupils: Pupils are equal, round, and reactive to light  Cardiovascular:      Rate and Rhythm: Normal rate and regular rhythm  Heart sounds: Normal heart sounds  Pulmonary:      Effort: Pulmonary effort is normal  No respiratory distress  Breath sounds: Normal breath sounds  Abdominal:      General: Bowel sounds are normal  There is no distension  Palpations: Abdomen is soft  There is no mass  Tenderness: There is no abdominal tenderness  Hernia: No hernia is present  Musculoskeletal:         General: Normal range of motion  Cervical back: Normal range of motion  Lymphadenopathy:      Cervical: No cervical adenopathy  Skin:     General: Skin is warm  Neurological:      Mental Status: She is alert and oriented to person, place, and time  Psychiatric:         Behavior: Behavior normal          Thought Content:  Thought content normal              Lab Results:     Lab Results   Component Value Date    WBC 4 67 09/10/2022    HGB 10 1 (L) 09/10/2022    HCT 32 0 (L) 09/10/2022    MCV 91 09/10/2022     09/10/2022       Lab Results   Component Value Date    K 4 0 09/11/2022     09/11/2022    CO2 23 09/11/2022    BUN 20 09/11/2022    CREATININE 1 27 09/11/2022    CALCIUM 8 0 (L) 09/11/2022    CORRECTEDCA 9 1 09/11/2022    AST 86 (H) 09/11/2022     (H) 09/11/2022    ALKPHOS 605 (H) 09/11/2022    EGFR 40 09/11/2022       Lab Results   Component Value Date    INR 0 99 09/09/2022    PROTIME 13 2 09/09/2022         Radiology Results:   MRI abdomen w wo contrast and mrcp    Result Date: 10/10/2022  Narrative: MRI OF THE ABDOMEN WITH AND WITHOUT CONTRAST WITH MRCP INDICATION: 65 years / Female  K85 81: Other acute pancreatitis with uninfected necrosis  COMPARISON: MRI abdomen 9/9/2022, CT abdomen pelvis 9/8/2022 TECHNIQUE:  The following pulse sequences were obtained:  axial T1 weighted in/out of phase images, multiplanar T2 weighted images, axial DWI/ADC, pre-contrast axial T1 with fat saturation and dynamic multiphase post-contrast fat suppressed T1 weighted images  3D MRCP images were obtained with radial thick slabs and projections  3D rendering was performed from the acquisition scanner  Imaging performed on 1 5T MRI IV Contrast:  9 mL of Gadobutrol injection (SINGLE-DOSE) FINDINGS: LOWER CHEST:   Unremarkable  LIVER: Normal in size and configuration  No suspicious mass  A 0 7 cm T2 hyperintense lesion in the right inferior hepatic lobe, without postcontrast enhancement, likely representing a cyst   The hepatic veins and portal veins are patent  BILE DUCTS:  Again seen is mild extrahepatic biliary ductal and central intrahepatic biliary ductal dilation  The left intrahepatic bile duct is also dilated, measuring 0 5 cm, best visualized on the coronal MRCP images series 10  The extrahepatic proximal bile duct measures 1 0 cm and the distal extrahepatic bile duct measures 1 0 cm, as seen on prior MRI abdomen 9/9/2022  No choledocholithiasis, biliary stricture or suspicious mass  GALLBLADDER:  Surgically absent PANCREAS:  Again seen is diffuse T1 hypointensity of the pancreatic parenchyma, likely representing sequela of recent acute pancreatitis    This limits evaluation for presence of a focal T1 hyperintense lesion  No definite abnormally enhancing focal lesion is seen on the postcontrast images  No definite peripancreatic fluid collections are seen  There is no pancreatic ductal dilation  ADRENAL GLANDS:  Normal  SPLEEN:  Again seen is a 0 5 cm T2 hyperintense lesion in the anterior aspect of the spleen (series 3 image 17, series 4 image 15), stable since prior MRI 9/9/2022  It demonstrates postcontrast enhancement as well as delayed enhancement, likely representing a hemangioma  KIDNEYS/PROXIMAL URETERS:  No hydroureteronephrosis  Sub-5 mm 2 hyperintense right renal lesion too small to characterize but likely representing a cyst   Left renal upper pole simple cyst  BOWEL:   No dilated loops of bowel  PERITONEUM/RETROPERITONEUM:  No ascites  LYMPH NODES:  No abdominal lymphadenopathy  VASCULAR STRUCTURES:  No aneurysm  ABDOMINAL WALL:  Unremarkable  OSSEOUS STRUCTURES:  No suspicious osseous lesion  Impression: *  Unchanged diffuse T1 hypointensity of the visualized pancreatic parenchyma without avid postcontrast enhancement, which can represent combination of necrotizing pancreatitis and scarring  Background T1 hypointensity limits evaluation for presence of subtle focal pancreatic lesions  No abnormal enhancing focus is seen on the postcontrast images  There is no main pancreatic ductal dilation  *  Stable more than expected intrahepatic and extra hepatic biliary ductal dilation without choledocholithiasis in this patient with history of prior cholecystectomy, unchanged in extent compared to prior MRI abdomen 9/9/2022  Findings can be secondary to prior cholecystectomy or due to presence of a subtle neoplasm around the distal common bile duct  Correlation with LFT is recommended  If the LFT are persistently elevated, ERCP and EUS can BE considered for further evaluation as nically warranted  *  Additional findings as above  The study was marked in EPIC for significant notification   Workstation performed: [id number]

## 2023-01-04 ENCOUNTER — TELEPHONE (OUTPATIENT)
Dept: GASTROENTEROLOGY | Facility: CLINIC | Age: 78
End: 2023-01-04

## 2023-01-04 ENCOUNTER — HOSPITAL ENCOUNTER (OUTPATIENT)
Dept: RADIOLOGY | Facility: HOSPITAL | Age: 78
Discharge: HOME | End: 2023-01-04

## 2023-01-04 DIAGNOSIS — K31.6 ACQUIRED GASTRIC FISTULA: ICD-10-CM

## 2023-01-04 NOTE — TELEPHONE ENCOUNTER
Patients GI provider:  Dr Diane Herring     Number to return call: [phone]    Reason for call: Delicia Butt from the [place] location called in to report significant findings on the Pt's Upper GI      Scheduled procedure/appointment date if applicable: 1/2/8335

## 2023-01-27 ENCOUNTER — HOSPITAL ENCOUNTER (OUTPATIENT)
Dept: MRI IMAGING | Facility: HOSPITAL | Age: 78
End: 2023-01-27

## 2023-01-27 DIAGNOSIS — K85.00 IDIOPATHIC ACUTE PANCREATITIS WITHOUT INFECTION OR NECROSIS: ICD-10-CM

## 2023-01-27 RX ADMIN — GADOBUTROL 9 ML: 604.72 INJECTION INTRAVENOUS at 13:48

## 2023-02-03 ENCOUNTER — OFFICE VISIT (OUTPATIENT)
Dept: GASTROENTEROLOGY | Facility: MEDICAL CENTER | Age: 78
End: 2023-02-03

## 2023-02-03 VITALS
HEART RATE: 76 BPM | OXYGEN SATURATION: 98 % | DIASTOLIC BLOOD PRESSURE: 72 MMHG | SYSTOLIC BLOOD PRESSURE: 112 MMHG | BODY MASS INDEX: 35.44 KG/M2 | WEIGHT: 200 LBS | HEIGHT: 63 IN

## 2023-02-03 DIAGNOSIS — K31.6 GASTROGASTRIC FISTULA: ICD-10-CM

## 2023-02-03 DIAGNOSIS — G89.4 CHRONIC PAIN SYNDROME: ICD-10-CM

## 2023-02-03 DIAGNOSIS — K85.00 IDIOPATHIC ACUTE PANCREATITIS WITHOUT INFECTION OR NECROSIS: ICD-10-CM

## 2023-02-03 DIAGNOSIS — R79.89 ELEVATED LFTS: Primary | ICD-10-CM

## 2023-02-03 NOTE — PROGRESS NOTES
Outpatient Follow up  [address]  [address]  [facility] [address]  Jude Oliveira MD  Ph : [phone]  Fax : [phone]  Mobile : [phone]  Email : [email or web address]  Also available on [employer]    Dakota Dobbins 77 y o  female MRN: [id number]    PCP: Millicent Munguia MD  Referring: No referring provider defined for this encounter  Dakota Dobbins presented for a follow up visit  My recommendations are included  Please do not hesitate to contact me with any questions you may have  ASSESSMENT AND PLAN:      No problem-specific Assessment & Plan notes found for this encounter  Diagnoses and all orders for this visit:    Elevated LFTs    Idiopathic acute pancreatitis without infection or necrosis  -     MRI abdomen w wo contrast and mrcp; Future    Gastrogastric fistula    Chronic pain syndrome    Other orders  -     Levothyroxine Sodium (SYNTHROID PO); Take 75 mg by mouth      1  Necrotizing pancreatitis  Doing well  Follow up MRI  Reviewed - no major issues but will wait on the final read  Will repeat MRI in 6 months  Blood work as previously ordered (IgG4)  2  GERD : stop the carafate  Continue the protonix but try to cut down to once a day  If symptoms persist then would recommend EGD  3  Constipation : increase fluid, fiber (add metamucil to the coffee), miralax every other day or as needed, senna (smooth move tea)  4  Colonoscopy : will get old records from Dr Kathia [facility] practice  Addendum : MRI results :   1   Stable mild intra- and extrahepatic biliary ductal dilatation, decreased from 9/2022, without findings of choledocholithiasis or periampullary obstructing mass  2   Diffuse pancreatic parenchymal signal abnormality and hypoenhancement compatible with sequela of prior pancreatitis  No active inflammation   No main ductal dilatation  ______________________________________________________________________    SUBJECTIVE: 65-year-old lady with a history of gastric bypass who presents to us for follow-up of her history of pancreatitis  Last visit 10/22 :   She had onset of pain in 9/8/22 and had severe pain  She went to the ER and had elevated LFT's and lipase  The lipase improved but the LFT's were still high  She was tretaed with liquids/ IVF  Now sometimes she eats somethings and has pain / dysphagia  Avoiding fatty foods  Mostly chicken / meats  Sometimes chicken or bread may get stuck at the bottom of the esophagus  She has had EGD's done sometime ago  At least 8 years ago  This could be due to nausea/ pain  She had a gastrogastric fistula  She was put on protonix and states that it had healed  Nausea  No vomiting  She does have constipation  She takes milk of magnesia       No recent antibiotics  No new medications  She takes hydrocodone for her back       She has been on protonix and carafate       She did not get any vaccinations  No recent infections       CT 2018 : Impression:   1  Oral contrast opacifies the excluded stomach, of uncertain significance  2  Hypodense pancreas without significant adjacent inflammation  Correlate for   pancreatitis       EGD : small fistula (unclear if it's going to the stomach or blind) at the [address] [place] [address] anastomosis area  She states that this was treated about 8 years ago  No surgery was done but endoscopic therapy was provided  It appears that it was still patent after that      Grandfather and aunt : pancreatic cancer  Aunt with ovarian cancer  Uncles with prostate cancer  At the last visit we had recommended to check for autoimmune pancreatitis and MRI  Discussed the possibility of doing eligible using the gastric gastric fistula if present to place a lumen opposing stent  Interval history :   She had the MRI done however this is not read yet  Has some nausea     She has some pain in the right upper quadrant and radiates to the back  Chronic  She has constipation  No change in weight  REVIEW OF SYSTEMS IS OTHERWISE NEGATIVE  Historical Information   Past Medical History:   Diagnosis Date   • Disease of thyroid gland    • GERD (gastroesophageal reflux disease)    • Hypertension      Past Surgical History:   Procedure Laterality Date   • HYSTERECTOMY     • REDUCTION MAMMAPLASTY Bilateral    • KAREEM-EN-Y PROCEDURE      open bypass and jeff Dr Dana Thakur   • TONSILLECTOMY       Social History   Social History     Substance and Sexual Activity   Alcohol Use Not Currently     Social History     Substance and Sexual Activity   Drug Use Never     Social History     Tobacco Use   Smoking Status Never   Smokeless Tobacco Never     Family History   Problem Relation Age of Onset   • Hypertension Mother    • Heart disease Mother    • COPD Mother    • Deep vein thrombosis Mother        Meds/Allergies       Current Outpatient Medications:   •  Biotin 1 MG CAPS  •  cholecalciferol (VITAMIN D3) 400 units tablet  •  ergocalciferol (ERGOCALCIFEROL) 1 25 MG (63043 UT) capsule  •  escitalopram (LEXAPRO) 20 mg tablet  •  HYDROcodone-acetaminophen (NORCO) 5-325 mg per tablet  •  levothyroxine 75 mcg tablet  •  Levothyroxine Sodium (SYNTHROID PO)  •  Pantoprazole Sodium (PROTONIX PO)  •  valsartan 160 mg TABS 160 mg, hydrochlorothiazide 12 5 mg TABS 12 5 mg    Allergies   Allergen Reactions   • Diclofenac Sodium Hives   • Sulfamethoxazole-Trimethoprim Hives and Swelling   • Prednisone Hives and GI Intolerance     "sweling" in face  Redness of face   Swelling cheeks,face red             Objective     Blood pressure 112/72, pulse 76, height 5' 3" (1 6 m), weight 90 7 kg (200 lb), SpO2 98 %  Body mass index is 35 43 kg/m²  PHYSICAL EXAM:      Physical Exam  Constitutional:       Appearance: Normal appearance  She is well-developed  HENT:      Head: Normocephalic and atraumatic     Eyes: General: No scleral icterus  Conjunctiva/sclera: Conjunctivae normal       Pupils: Pupils are equal, round, and reactive to light  Cardiovascular:      Rate and Rhythm: Normal rate and regular rhythm  Heart sounds: Normal heart sounds  Pulmonary:      Effort: Pulmonary effort is normal  No respiratory distress  Breath sounds: Normal breath sounds  Abdominal:      General: Bowel sounds are normal  There is no distension  Palpations: Abdomen is soft  There is no mass  Tenderness: There is no abdominal tenderness  Hernia: No hernia is present  Musculoskeletal:         General: Normal range of motion  Cervical back: Normal range of motion  Lymphadenopathy:      Cervical: No cervical adenopathy  Skin:     General: Skin is warm  Neurological:      Mental Status: She is alert and oriented to person, place, and time  Psychiatric:         Behavior: Behavior normal          Thought Content: Thought content normal          Lab Results:   No visits with results within 1 Day(s) from this visit     Latest known visit with results is:   Admission on 09/08/2022, Discharged on 09/11/2022   Component Date Value   • WBC 09/08/2022 7 77    • RBC 09/08/2022 4 29    • Hemoglobin 09/08/2022 12 6    • Hematocrit 09/08/2022 38 6    • MCV 09/08/2022 90    • MCH 09/08/2022 29 4    • MCHC 09/08/2022 32 6    • RDW 09/08/2022 13 8    • MPV 09/08/2022 10 0    • Platelets 84/09/0081 341    • nRBC 09/08/2022 0    • Neutrophils Relative 09/08/2022 80 (H)    • Immat GRANS % 09/08/2022 0    • Lymphocytes Relative 09/08/2022 8 (L)    • Monocytes Relative 09/08/2022 8    • Eosinophils Relative 09/08/2022 3    • Basophils Relative 09/08/2022 1    • Neutrophils Absolute 09/08/2022 6 24    • Immature Grans Absolute 09/08/2022 0 03    • Lymphocytes Absolute 09/08/2022 0 65    • Monocytes Absolute 09/08/2022 0 59    • Eosinophils Absolute 09/08/2022 0 22    • Basophils Absolute 09/08/2022 0 04    • Sodium 09/08/2022 136    • Potassium 09/08/2022 4 6    • Chloride 09/08/2022 101    • CO2 09/08/2022 26    • ANION GAP 09/08/2022 9    • BUN 09/08/2022 25    • Creatinine 09/08/2022 1 31 (H)    • Glucose 09/08/2022 119    • Calcium 09/08/2022 9 0    • AST 09/08/2022 440 (H)    • ALT 09/08/2022 263 (H)    • Alkaline Phosphatase 09/08/2022 544 (H)    • Total Protein 09/08/2022 7 2    • Albumin 09/08/2022 3 8    • Total Bilirubin 09/08/2022 2 22 (H)    • eGFR 09/08/2022 39    • Lipase 09/08/2022 5,360 (H)    • Lipase 09/09/2022 1,791 (H)    • Sodium 09/09/2022 137    • Potassium 09/09/2022 4 1    • Chloride 09/09/2022 102    • CO2 09/09/2022 25    • ANION GAP 09/09/2022 10    • BUN 09/09/2022 21    • Creatinine 09/09/2022 1 39 (H)    • Glucose 09/09/2022 128    • Calcium 09/09/2022 8 5    • Corrected Calcium 09/09/2022 9 2    • AST 09/09/2022 476 (H)    • ALT 09/09/2022 373 (H)    • Alkaline Phosphatase 09/09/2022 559 (H)    • Total Protein 09/09/2022 6 1 (L)    • Albumin 09/09/2022 3 1 (L)    • Total Bilirubin 09/09/2022 3 24 (H)    • eGFR 09/09/2022 36    • Magnesium 09/09/2022 2 1    • Phosphorus 09/09/2022 3 7    • WBC 09/09/2022 7 32    • RBC 09/09/2022 3 80 (L)    • Hemoglobin 09/09/2022 11 1 (L)    • Hematocrit 09/09/2022 33 9 (L)    • MCV 09/09/2022 89    • MCH 09/09/2022 29 2    • MCHC 09/09/2022 32 7    • RDW 09/09/2022 13 9    • MPV 09/09/2022 9 8    • Platelets 85/15/8039 280    • nRBC 09/09/2022 0    • Neutrophils Relative 09/09/2022 87 (H)    • Immat GRANS % 09/09/2022 0    • Lymphocytes Relative 09/09/2022 4 (L)    • Monocytes Relative 09/09/2022 8    • Eosinophils Relative 09/09/2022 1    • Basophils Relative 09/09/2022 0    • Neutrophils Absolute 09/09/2022 6 33    • Immature Grans Absolute 09/09/2022 0 02    • Lymphocytes Absolute 09/09/2022 0 28 (L)    • Monocytes Absolute 09/09/2022 0 60    • Eosinophils Absolute 09/09/2022 0 07    • Basophils Absolute 09/09/2022 0 02    • Triglycerides 09/09/2022 45    • TSH 3RD GENERATON 09/09/2022 0 301 (L)    • Protime 09/09/2022 13 2    • INR 09/09/2022 0 99    • Free T4 09/09/2022 1 55 (H)    • T3, Free 09/08/2022 2 66    • Procalcitonin 09/10/2022 0 35 (H)    • WBC 09/10/2022 4 67    • RBC 09/10/2022 3 52 (L)    • Hemoglobin 09/10/2022 10 1 (L)    • Hematocrit 09/10/2022 32 0 (L)    • MCV 09/10/2022 91    • MCH 09/10/2022 28 7    • MCHC 09/10/2022 31 6    • RDW 09/10/2022 14 1    • Platelets 73/07/2482 214    • MPV 09/10/2022 10 1    • Sodium 09/10/2022 137    • Potassium 09/10/2022 3 9    • Chloride 09/10/2022 104    • CO2 09/10/2022 21    • ANION GAP 09/10/2022 12    • BUN 09/10/2022 21    • Creatinine 09/10/2022 1 41 (H)    • Glucose 09/10/2022 79    • Calcium 09/10/2022 7 9 (L)    • Corrected Calcium 09/10/2022 9 0    • AST 09/10/2022 180 (H)    • ALT 09/10/2022 263 (H)    • Alkaline Phosphatase 09/10/2022 531 (H)    • Total Protein 09/10/2022 5 4 (L)    • Albumin 09/10/2022 2 6 (L)    • Total Bilirubin 09/10/2022 4 27 (H)    • eGFR 09/10/2022 35    • Lipase 09/10/2022 120    • Hepatitis B Surface Ag 09/10/2022 Non-reactive    • Hep A IgM 09/10/2022 Non-reactive    • Hepatitis C Ab 09/10/2022 Non-reactive    • Hep B C IgM 09/10/2022 Non-reactive    • Sodium 09/11/2022 136    • Potassium 09/11/2022 4 0    • Chloride 09/11/2022 104    • CO2 09/11/2022 23    • ANION GAP 09/11/2022 9    • BUN 09/11/2022 20    • Creatinine 09/11/2022 1 27    • Glucose 09/11/2022 95    • Calcium 09/11/2022 8 0 (L)    • Corrected Calcium 09/11/2022 9 1    • AST 09/11/2022 86 (H)    • ALT 09/11/2022 199 (H)    • Alkaline Phosphatase 09/11/2022 605 (H)    • Total Protein 09/11/2022 5 7 (L)    • Albumin 09/11/2022 2 6 (L)    • Total Bilirubin 09/11/2022 2 21 (H)    • eGFR 09/11/2022 40    • Lipase 09/11/2022 80          Radiology Results:   FL upper GI UGI    Result Date: 1/4/2023  Narrative: UPPER GI SERIES  SINGLE CONTRAST INDICATION:  K31 6: Fistula of stomach and duodenum    History of acquired gastric fistula; history of pancreatitis; history of remote fistula in the past COMPARISON:  Outside barium study from 1/30/2018 as well as to 2/9/2018 and CT from 9/8/2022  IMAGES:  5 exposures with multiple screen saves FLUOROSCOPY TIME:  1 minute 3 seocnds TECHNIQUE:  The patient was given barium by mouth and images of the esophagus, stomach, and small bowel were obtained  FINDINGS:  projection demonstrates surgical sutures and clips in the left upper quadrant similar to prior studies  Barium flows from the esophagus into the gastric pouch without significant delay  There is immediate emptying into the gastrojejunostomy without evidence of stricture/delay  On 2nd round of swallowing as well as more delayed imaging, there is evidence of opacification to the left of the suture lines slightly posterior to the gastrojejunostomy  (Image 8, series 4, image 5 series 4, image 6, series 4)  This structure becomes  larger during the course of the examination and extends anterior and to the right of the gastrojejunostomy the location of the native gastric pouch  At some point during examination this structure became smaller and then larger again  This suggests ongoing communication with the GI tract  This large collection is not visible on the 2018 upper GI studies although a small area is noted to the left the sutures on some of the images and may have represented a small communication although difficult to identify on multiple views  The area anterior towards the midline was not visible on those examinations  Suggestion of rugal folds is noted on a delayed image (image 1, series 3)  Distal small bowel loops are not dilated  Gastroesophageal reflux was observed  There is no hiatal hernia  Impression: Status post gastrojejunostomy without stricture or obstruction   A 2nd lumen opacifies to the left of the anastomotic sutures and then extends anteromedially and most likely does represents a fistula partially opacifying the native gastric lumen  Some reflux was noted during the course of the examination  The study was marked in EPIC for significant notification   Workstation performed: [id number]

## 2023-02-07 NOTE — RESULT ENCOUNTER NOTE
MRI results were discussed with the patient  No evidence of mass or stones  Ducts were dilated but decreased from September 2022  Evidence of prior pancreatitis

## 2023-02-23 ENCOUNTER — APPOINTMENT (OUTPATIENT)
Dept: LAB | Facility: HOSPITAL | Age: 78
End: 2023-02-23

## 2023-02-23 DIAGNOSIS — R79.89 ELEVATED LFTS: ICD-10-CM

## 2023-02-23 DIAGNOSIS — K85.00 IDIOPATHIC ACUTE PANCREATITIS WITHOUT INFECTION OR NECROSIS: ICD-10-CM

## 2023-02-23 LAB
ALBUMIN SERPL BCP-MCNC: 4 G/DL (ref 3.5–5)
ALP SERPL-CCNC: 71 U/L (ref 34–104)
ALT SERPL W P-5'-P-CCNC: 8 U/L (ref 7–52)
ANION GAP SERPL CALCULATED.3IONS-SCNC: 3 MMOL/L (ref 4–13)
AST SERPL W P-5'-P-CCNC: 12 U/L (ref 13–39)
BILIRUB DIRECT SERPL-MCNC: 0.15 MG/DL (ref 0–0.2)
BILIRUB SERPL-MCNC: 1.03 MG/DL (ref 0.2–1)
BUN SERPL-MCNC: 26 MG/DL (ref 5–25)
CALCIUM SERPL-MCNC: 9 MG/DL (ref 8.4–10.2)
CHLORIDE SERPL-SCNC: 106 MMOL/L (ref 96–108)
CO2 SERPL-SCNC: 31 MMOL/L (ref 21–32)
CREAT SERPL-MCNC: 1.2 MG/DL (ref 0.6–1.3)
GFR SERPL CREATININE-BSD FRML MDRD: 43 ML/MIN/1.73SQ M
GLUCOSE P FAST SERPL-MCNC: 93 MG/DL (ref 65–99)
LIPASE SERPL-CCNC: 6 U/L (ref 11–82)
POTASSIUM SERPL-SCNC: 4.5 MMOL/L (ref 3.5–5.3)
PROT SERPL-MCNC: 6.6 G/DL (ref 6.4–8.4)
SODIUM SERPL-SCNC: 140 MMOL/L (ref 135–147)

## 2023-02-24 LAB
IGG SERPL-MCNC: 593 MG/DL (ref 586–1602)
IGG1 SER-MCNC: 331 MG/DL (ref 248–810)
IGG2 SER-MCNC: 164 MG/DL (ref 130–555)
IGG3 SER-MCNC: 42 MG/DL (ref 15–102)
IGG4 SER-MCNC: 9 MG/DL (ref 2–96)

## 2023-07-18 ENCOUNTER — TELEPHONE (OUTPATIENT)
Age: 78
End: 2023-07-18

## 2023-07-18 NOTE — TELEPHONE ENCOUNTER
Pt called in about lab work prior to MRI. I contacted radiology and confirmed with pt that no additional lab work needed. Suki from MRI confirmed CMP results from 7/5 are sufficient.

## 2023-08-14 ENCOUNTER — HOSPITAL ENCOUNTER (OUTPATIENT)
Dept: MRI IMAGING | Facility: HOSPITAL | Age: 78
Discharge: HOME | End: 2023-08-14
Payer: MEDICARE

## 2023-08-14 DIAGNOSIS — K85.00 IDIOPATHIC ACUTE PANCREATITIS WITHOUT INFECTION OR NECROSIS: ICD-10-CM

## 2023-08-14 PROCEDURE — A9585 GADOBUTROL INJECTION: HCPCS | Performed by: INTERNAL MEDICINE

## 2023-08-14 PROCEDURE — 74183 MRI ABD W/O CNTR FLWD CNTR: CPT

## 2023-08-14 RX ORDER — GADOBUTROL 604.72 MG/ML
9 INJECTION INTRAVENOUS
Status: COMPLETED | OUTPATIENT
Start: 2023-08-14 | End: 2023-08-14

## 2023-08-14 RX ADMIN — GADOBUTROL 9 ML: 604.72 INJECTION INTRAVENOUS at 14:44

## 2023-08-21 ENCOUNTER — NURSE TRIAGE (OUTPATIENT)
Age: 78
End: 2023-08-21

## 2023-08-30 ENCOUNTER — TELEPHONE (OUTPATIENT)
Age: 78
End: 2023-08-30

## 2023-08-30 NOTE — TELEPHONE ENCOUNTER
Patients GI provider:  Dr. Krystal Burks    Number to return call: [phone]    Reason for call: Pt calling to speak to someone about her MRI results.      Scheduled procedure/appointment date if applicable: Appt 31/96/12

## 2023-11-29 ENCOUNTER — OFFICE VISIT (OUTPATIENT)
Dept: GASTROENTEROLOGY | Facility: MEDICAL CENTER | Age: 78
End: 2023-11-29
Payer: MEDICARE

## 2023-11-29 VITALS
SYSTOLIC BLOOD PRESSURE: 134 MMHG | HEIGHT: 63 IN | TEMPERATURE: 98.2 F | DIASTOLIC BLOOD PRESSURE: 79 MMHG | HEART RATE: 58 BPM | WEIGHT: 201 LBS | BODY MASS INDEX: 35.61 KG/M2

## 2023-11-29 DIAGNOSIS — E66.01 OBESITY, MORBID (HCC): ICD-10-CM

## 2023-11-29 DIAGNOSIS — K86.1 OTHER CHRONIC PANCREATITIS (HCC): ICD-10-CM

## 2023-11-29 DIAGNOSIS — K59.01 SLOW TRANSIT CONSTIPATION: Primary | ICD-10-CM

## 2023-11-29 PROCEDURE — 99213 OFFICE O/P EST LOW 20 MIN: CPT | Performed by: INTERNAL MEDICINE

## 2023-11-29 RX ORDER — LINACLOTIDE 145 UG/1
145 CAPSULE, GELATIN COATED ORAL DAILY
Qty: 30 CAPSULE | Refills: 0 | Status: SHIPPED | OUTPATIENT
Start: 2023-11-29 | End: 2023-12-29

## 2023-11-29 RX ORDER — VALSARTAN 80 MG/1
80 TABLET ORAL DAILY
COMMUNITY

## 2023-11-29 RX ORDER — ONDANSETRON 4 MG/1
1 TABLET, ORALLY DISINTEGRATING ORAL EVERY 8 HOURS PRN
COMMUNITY
Start: 2023-08-11

## 2023-11-29 RX ORDER — SUCRALFATE 1 G/1
TABLET ORAL
COMMUNITY

## 2023-11-29 NOTE — PROGRESS NOTES
Outpatient Follow up  [address] [place] [address]  [facility] [address]  Serenity Danielson MD  Ph : [phone]  Fax : [phone]  Mobile : [phone]  Email : [email or web address]  Also available on [employer] Text    Aaron Pascual 77 y.o. female MRN: [id number]    PCP: Josue Mccain MD  Referring: No referring provider defined for this encounter. Aaron Pascual presented for a follow up visit. My recommendations are included. Please do not hesitate to contact me with any questions you may have. ASSESSMENT AND PLAN:      No problem-specific Assessment & Plan notes found for this encounter. Diagnoses and all orders for this visit:    Slow transit constipation  -     linaCLOtide (Linzess) 145 MCG CAPS; Take 1 capsule (145 mcg total) by mouth daily    Other chronic pancreatitis (HCC)    Obesity, morbid ([address])    Other orders  -     ondansetron (ZOFRAN-ODT) 4 mg disintegrating tablet; Take 1 tablet by mouth every 8 (eight) hours as needed  -     sucralfate (CARAFATE) 1 g tablet;  (Patient not taking: Reported on 11/29/2023)  -     valsartan (DIOVAN) 80 mg tablet; Take 80 mg by mouth daily      #1 constipation. Consider Linzess. While you are on the Linzess then do not take the MiraLAX. If the [place] helps then please let us know we will send in a prescription. #2 nausea continue Zofran. 3.  History of pancreatitis. Most recent MRI was reviewed. No significant pancreatic necrosis was seen. The dilation was stable. No concern for mass lesion.   We will continue to follow  Follow-up in the next few months  ______________________________________________________________________    SUBJECTIVE:  77y.o. year old who presents with Constipation (Patient states constantly constipated. ), Abdominal Pain (Patient states pain lower abdomen that radiates to the right ), and Loss of Appetite (States she is hungry but then starts eating and loses her appetite)       Last GI office visit : 2/23    Summary of recommendations from last visit :   Necrotizing pancreatitis (thought not seen on subsequent imaging). Gastrogastric fistula (prior h/o gastric bypass)  GERD  constipation    Tests completed after last visit :   MRI  2/23: 1.  Stable mild intra- and extrahepatic biliary ductal dilatation, decreased from 9/2022, without findings of choledocholithiasis or periampullary obstructing mass. 2.  Diffuse pancreatic parenchymal signal abnormality and hypoenhancement compatible with sequela of prior pancreatitis. No active inflammation. No main ductal dilatation. MRI 8/23 : 1. Diffuse intracellular lipid throughout the pancreas, similar to prior MRIs since 9/9/2022. Upon my review, there is no evidence of pancreatic necrosis on studies dating back to CT 9/8/2022.   2. Stable intrahepatic and extrahepatic biliary ductal dilation since 9/8/2022. No choledocholithiasis or suspicious mass. Interval history :    She continues to have issues with intermittent abdominal pain which is mainly in the lower abdomen. Occurs intermittently. Only last for short time. Usually associated with improvement with bowel movement. She does have constipation and may go few days without having a bowel movement. She does take MiraLAX. She takes Metamucil. She does have mild nausea. She is however not losing any weight. She does feel bloated. PRIOR ENDOSCOPIC EVALUATION :     Endoscopy : yes    Colonoscopy : 04/20/2018     Last Cologuard: Not on file        REVIEW OF SYSTEMS IS OTHERWISE NEGATIVE.       Historical Information   Past Medical History:   Diagnosis Date    Disease of thyroid gland     GERD (gastroesophageal reflux disease)     Hypertension      Past Surgical History:   Procedure Laterality Date    HYSTERECTOMY      REDUCTION MAMMAPLASTY Bilateral     KAREEM-EN-Y PROCEDURE      open bypass and jeff Dr. Rivera Cue History Social History     Substance and Sexual Activity   Alcohol Use Not Currently     Social History     Substance and Sexual Activity   Drug Use Never     Social History     Tobacco Use   Smoking Status Never   Smokeless Tobacco Never     Family History   Problem Relation Age of Onset    Hypertension Mother     Heart disease Mother     COPD Mother     Deep vein thrombosis Mother     Pancreatic cancer Maternal Grandfather     Pancreatic cancer Maternal Aunt     Colon cancer Maternal Uncle        Meds/Allergies       Current Outpatient Medications:     Biotin 1 MG CAPS    cholecalciferol (VITAMIN D3) 400 units tablet    escitalopram (LEXAPRO) 20 mg tablet    HYDROcodone-acetaminophen (NORCO) 5-325 mg per tablet    Levothyroxine Sodium (SYNTHROID PO)    linaCLOtide (Linzess) 145 MCG CAPS    ondansetron (ZOFRAN-ODT) 4 mg disintegrating tablet    valsartan (DIOVAN) 80 mg tablet    valsartan 160 mg TABS 160 mg, hydrochlorothiazide 12.5 mg TABS 12.5 mg    ergocalciferol (ERGOCALCIFEROL) 1.25 MG (98575 UT) capsule    levothyroxine 75 mcg tablet    Pantoprazole Sodium (PROTONIX PO)    sucralfate (CARAFATE) 1 g tablet    Allergies   Allergen Reactions    Diclofenac Sodium Hives    Sulfamethoxazole-Trimethoprim Hives and Swelling    Prednisone Hives and GI Intolerance     "sweling" in face  Redness of face   Swelling cheeks,face red             Objective     Blood pressure 134/79, pulse 58, temperature 98.2 °F (36.8 °C), temperature source Tympanic, height 5' 3" (1.6 m), weight 91.2 kg (201 lb). Body mass index is 35.61 kg/m². PHYSICAL EXAM:      Physical Exam  Constitutional:       Appearance: Normal appearance. She is well-developed. HENT:      Head: Normocephalic and atraumatic. Eyes:      General: No scleral icterus. Conjunctiva/sclera: Conjunctivae normal.      Pupils: Pupils are equal, round, and reactive to light. Cardiovascular:      Rate and Rhythm: Normal rate and regular rhythm.       Heart sounds: Normal heart sounds. Pulmonary:      Effort: Pulmonary effort is normal. No respiratory distress. Breath sounds: Normal breath sounds. Abdominal:      General: Bowel sounds are normal. There is no distension. Palpations: Abdomen is soft. There is no mass. Tenderness: There is no abdominal tenderness. Hernia: No hernia is present. Musculoskeletal:         General: Normal range of motion. Cervical back: Normal range of motion. Lymphadenopathy:      Cervical: No cervical adenopathy. Skin:     General: Skin is warm. Neurological:      Mental Status: She is alert and oriented to person, place, and time. Psychiatric:         Behavior: Behavior normal.         Thought Content: Thought content normal.       Lab Results:   Lab Results   Component Value Date/Time    WBC 4.67 09/10/2022 05:00 AM    HGB 10.1 (L) 09/10/2022 05:00 AM    HCT 32.0 (L) 09/10/2022 05:00 AM    MCV 91 09/10/2022 05:00 AM     09/10/2022 05:00 AM    SODIUM 140 02/23/2023 01:59 PM    K 4.5 02/23/2023 01:59 PM     02/23/2023 01:59 PM    CO2 31 02/23/2023 01:59 PM    BUN 26 (H) 02/23/2023 01:59 PM    CREATININE 1.20 02/23/2023 01:59 PM    GLUF 93 02/23/2023 01:59 PM    AST 12 (L) 02/23/2023 01:59 PM    ALT 8 02/23/2023 01:59 PM    ALKPHOS 71 02/23/2023 01:59 PM    TBILI 1.03 (H) 02/23/2023 01:59 PM    BILIDIR 0.15 02/23/2023 01:59 PM    ALB 4.0 02/23/2023 01:59 PM    INR 0.99 09/09/2022 06:22 AM    LIPASE 6 (L) 02/23/2023 01:59 PM         Radiology Results:   No results found.

## 2024-03-08 ENCOUNTER — OFFICE VISIT (OUTPATIENT)
Dept: GASTROENTEROLOGY | Facility: MEDICAL CENTER | Age: 79
End: 2024-03-08
Payer: MEDICARE

## 2024-03-08 VITALS
DIASTOLIC BLOOD PRESSURE: 84 MMHG | SYSTOLIC BLOOD PRESSURE: 137 MMHG | HEART RATE: 52 BPM | TEMPERATURE: 98 F | BODY MASS INDEX: 34.69 KG/M2 | HEIGHT: 63 IN | WEIGHT: 195.8 LBS

## 2024-03-08 DIAGNOSIS — R10.13 EPIGASTRIC PAIN: Primary | ICD-10-CM

## 2024-03-08 DIAGNOSIS — E66.01 OBESITY, MORBID (HCC): ICD-10-CM

## 2024-03-08 DIAGNOSIS — K59.09 CHRONIC CONSTIPATION: ICD-10-CM

## 2024-03-08 DIAGNOSIS — K86.1 OTHER CHRONIC PANCREATITIS (HCC): ICD-10-CM

## 2024-03-08 PROCEDURE — 99214 OFFICE O/P EST MOD 30 MIN: CPT | Performed by: NURSE PRACTITIONER

## 2024-03-08 NOTE — PROGRESS NOTES
[facility] - Outpatient Follow-up Note  Gabrielle Man 79 y.o. female MRN: [id number]  Encounter: [id number]          ASSESSMENT AND PLAN:      1.  Chronic constipation    History of chronic constipation.  Reports that she never took the Linzess.  She has increased her water intake and her BMs are every 2 to 3 days.  She feels like she is completely evacuating.  Denies any melena or hematochezia.  We did discuss MiraLAX 1 capful daily.  She reports that she is comfortable and she would like to hold on any other medications for constipation at this time.    -Continue fiber daily  -Continue water intake of 64 ounces daily  -MiraLAX 1 capful daily as needed    2.  Upper abdominal pain  3.  Nausea  4.  History of pancreatitis    Started several months ago with epigastric and right upper quadrant pain that radiates around to her right flank for several months.  Can be worse after eating.  She does get some nausea associated with it but no vomiting.  She is lost approximately 6 pounds in the past 3 months.  Appetite is decreased.  Does not recall when her last EGD was but it was at least more than 10 years ago.  She was on pantoprazole 20 mg twice daily but it was stopped.  She reports she is felt no different off of pantoprazole.  She does drink caffeine daily.  No NSAID use.  No alcohol use.  Recent CMP was normal other than creatinine of 1.42.  Most recent imaging was MRI 8/23 which noted diffuse intracellular lipid throughout the pancreas, similar to prior MRI since 9/22.  No evidence of pancreatic necrosis or studies dating back to 9/22. Stable intrahepatic and extrahepatic biliary ductal dilatation since 9/22.  No choledocholithiasis or suspicious mass.  Recent LFTs are normal.  She does have a history of gastric bypass done approximately 30 years ago.  No heartburn or reflux.  Will rule out PUD, H. pylori, gastritis, celiac, pancreatitis.  She does not want to start any medication for her  abdominal pain at this time.    -Stool for H. pylori, celiac panel, lipase, CBC  -EGD  -CT abdomen pelvis  -Follow-up in office after test    I reviewed with patient/family potential risks of endoscopic evaluation including possible infection, bleeding or perforation.  Patient/family verbalized understanding of potential risks and agreed to procedure(s).  ______________________________________________________________________    SUBJECTIVE: 79-year-old female here for follow-up.  She was last seen by Dr. Morrissey 11/29/2023 for constipation and chronic pancreatitis.    History of chronic constipation.  Reports that she never took the Linzess.  She has increased her water intake and her BMs are every 2 to 3 days.  She feels like she is completely evacuating.  Denies any melena or hematochezia.  We did discuss MiraLAX 1 capful daily.  Consider taking it.    History of pancreatitis.  Most recent MRI 8/23 noted diffuse intracellular lipid throughout the pancreas, similar to prior MRI since 9/22.  No evidence of pancreatic necrosis or studies dating back to 9/22. Stable intrahepatic and extrahepatic biliary ductal dilatation since 9/22.  No choledocholithiasis or suspicious mass.  Reporting she has had epigastric and right upper quadrant pain that radiates around to her right flank for several months.  Can be worse after eating.  She does get some nausea associated with it but no vomiting.  She is lost approximately 6 pounds in the past 3 months.  Appetite is decreased.  Does not recall when her last EGD was but it was at least more than 10 years ago.  She was on pantoprazole 20 mg twice daily but it was stopped.  She reports she is felt no different off of pantoprazole.  She does drink caffeine daily.  No NSAID use.  No alcohol use.  Recent CMP was normal other than creatinine of 1.42.    Labs 2/24-CMP-creatinine 1.42, BUN 30, GFR 37.4      MRI abdomen 8/23-diffuse intracellular lipid throughout the pancreas, similar to  prior MRI since 9/9/2022.  Upon review, there is no evidence of pancreatic necrosis or studies dating back to CT 9/8/2022.  Stable intrahepatic and extrahepatic biliary ductal dilatation since 9/22.  No choledocholithiasis or suspicious mass.    Prior EGD/colonoscopy    Colonoscopy 4/18-internal hemorrhoids otherwise normal exam.    REVIEW OF SYSTEMS IS OTHERWISE NEGATIVE.  10 point review of systems negative other than per HPI      Historical Information   Past Medical History:   Diagnosis Date   • Disease of thyroid gland    • GERD (gastroesophageal reflux disease)    • Hypertension      Past Surgical History:   Procedure Laterality Date   • HYSTERECTOMY     • REDUCTION MAMMAPLASTY Bilateral    • KAREEM-EN-Y PROCEDURE      open bypass and jeff Dr. Waite   • TONSILLECTOMY       Social History   Social History     Substance and Sexual Activity   Alcohol Use Not Currently     Social History     Substance and Sexual Activity   Drug Use Never     Social History     Tobacco Use   Smoking Status Never   Smokeless Tobacco Never     Family History   Problem Relation Age of Onset   • Hypertension Mother    • Heart disease Mother    • COPD Mother    • Deep vein thrombosis Mother    • Pancreatic cancer Maternal Grandfather    • Pancreatic cancer Maternal Aunt    • Colon cancer Maternal Uncle        Meds/Allergies       Current Outpatient Medications:   •  Biotin 1 MG CAPS  •  cholecalciferol (VITAMIN D3) 400 units tablet  •  ergocalciferol (ERGOCALCIFEROL) 1.25 MG (83167 UT) capsule  •  escitalopram (LEXAPRO) 20 mg tablet  •  HYDROcodone-acetaminophen (NORCO) 5-325 mg per tablet  •  Levothyroxine Sodium (SYNTHROID PO)  •  ondansetron (ZOFRAN-ODT) 4 mg disintegrating tablet  •  valsartan (DIOVAN) 80 mg tablet  •  valsartan 160 mg TABS 160 mg, hydrochlorothiazide 12.5 mg TABS 12.5 mg  •  levothyroxine 75 mcg tablet  •  linaCLOtide (Linzess) 145 MCG CAPS  •  Pantoprazole Sodium (PROTONIX PO)  •  sucralfate (CARAFATE) 1 g  "tablet    Allergies   Allergen Reactions   • Diclofenac Sodium Hives   • Sulfamethoxazole-Trimethoprim Hives and Swelling   • Prednisone Hives and GI Intolerance     \"sweling\" in face  Redness of face   Swelling cheeks,face red             Objective     Blood pressure 137/84, pulse (!) 52, temperature 98 °F (36.7 °C), temperature source Tympanic, height 5' 3\" (1.6 m), weight 88.8 kg (195 lb 12.8 oz). Body mass index is 34.68 kg/m².      PHYSICAL EXAM:      General Appearance:   Alert, cooperative, no distress   HEENT:   Normocephalic, atraumatic, anicteric.     Neck:  Supple, symmetrical, trachea midline   Lungs:   Clear to auscultation bilaterally; no rales, rhonchi or wheezing; respirations unlabored    Heart::   Regular rate and rhythm; no murmur, rub, or gallop.   Abdomen:   Soft, tender in epigastric region.  No rebound tenderness, non-distended; normal bowel sounds; no masses, no organomegaly    Genitalia:   Deferred    Rectal:   Deferred    Extremities:  No cyanosis, clubbing or edema    Pulses:  2+ and symmetric    Skin:  No jaundice, rashes, or lesions    Lymph nodes:  No palpable cervical lymphadenopathy        Lab Results:   No visits with results within 1 Day(s) from this visit.   Latest known visit with results is:   Lab on 02/23/2023   Component Date Value   • Sodium 02/23/2023 140    • Potassium 02/23/2023 4.5    • Chloride 02/23/2023 106    • CO2 02/23/2023 31    • ANION GAP 02/23/2023 3 (L)    • BUN 02/23/2023 26 (H)    • Creatinine 02/23/2023 1.20    • Glucose, Fasting 02/23/2023 93    • Calcium 02/23/2023 9.0    • AST 02/23/2023 12 (L)    • ALT 02/23/2023 8    • Alkaline Phosphatase 02/23/2023 71    • Total Protein 02/23/2023 6.6    • Albumin 02/23/2023 4.0    • Total Bilirubin 02/23/2023 1.03 (H)    • eGFR 02/23/2023 43    • IgG 1 02/23/2023 331    • IgG 2 02/23/2023 164    • IgG 3 02/23/2023 42    • IgG 4 02/23/2023 9    • IgG 02/23/2023 593    • Bilirubin, Direct 02/23/2023 0.15    • Lipase " 02/23/2023 6 (L)          Radiology Results:   No results found.

## 2024-03-15 ENCOUNTER — APPOINTMENT (OUTPATIENT)
Dept: LAB | Facility: HOSPITAL | Age: 79
End: 2024-03-15

## 2024-03-19 ENCOUNTER — HOSPITAL ENCOUNTER (OUTPATIENT)
Dept: CT IMAGING | Facility: HOSPITAL | Age: 79
Discharge: HOME | End: 2024-03-19
Payer: MEDICARE

## 2024-03-19 ENCOUNTER — LAB (OUTPATIENT)
Dept: LAB | Facility: HOSPITAL | Age: 79
End: 2024-03-19
Payer: MEDICARE

## 2024-03-19 DIAGNOSIS — R10.13 EPIGASTRIC PAIN: ICD-10-CM

## 2024-03-19 DIAGNOSIS — K86.1 OTHER CHRONIC PANCREATITIS (HCC): ICD-10-CM

## 2024-03-19 LAB
BASOPHILS # BLD AUTO: 0.04 THOUSANDS/ÂΜL (ref 0–0.1)
BASOPHILS NFR BLD AUTO: 1 % (ref 0–1)
EOSINOPHIL # BLD AUTO: 0.35 THOUSAND/ÂΜL (ref 0–0.61)
EOSINOPHIL NFR BLD AUTO: 9 % (ref 0–6)
ERYTHROCYTE [DISTWIDTH] IN BLOOD BY AUTOMATED COUNT: 13.6 % (ref 11.6–15.1)
HCT VFR BLD AUTO: 36.8 % (ref 34.8–46.1)
HGB BLD-MCNC: 11.8 G/DL (ref 11.5–15.4)
IGA SERPL-MCNC: 256 MG/DL (ref 66–433)
IMM GRANULOCYTES # BLD AUTO: 0.02 THOUSAND/UL (ref 0–0.2)
IMM GRANULOCYTES NFR BLD AUTO: 1 % (ref 0–2)
LIPASE SERPL-CCNC: 9 U/L (ref 11–82)
LYMPHOCYTES # BLD AUTO: 0.72 THOUSANDS/ÂΜL (ref 0.6–4.47)
LYMPHOCYTES NFR BLD AUTO: 19 % (ref 14–44)
MCH RBC QN AUTO: 29.6 PG (ref 26.8–34.3)
MCHC RBC AUTO-ENTMCNC: 32.1 G/DL (ref 31.4–37.4)
MCV RBC AUTO: 92 FL (ref 82–98)
MONOCYTES # BLD AUTO: 0.27 THOUSAND/ÂΜL (ref 0.17–1.22)
MONOCYTES NFR BLD AUTO: 7 % (ref 4–12)
NEUTROPHILS # BLD AUTO: 2.44 THOUSANDS/ÂΜL (ref 1.85–7.62)
NEUTS SEG NFR BLD AUTO: 63 % (ref 43–75)
NRBC BLD AUTO-RTO: 0 /100 WBCS
PLATELET # BLD AUTO: 232 THOUSANDS/UL (ref 149–390)
PMV BLD AUTO: 10.4 FL (ref 8.9–12.7)
RBC # BLD AUTO: 3.99 MILLION/UL (ref 3.81–5.12)
WBC # BLD AUTO: 3.84 THOUSAND/UL (ref 4.31–10.16)

## 2024-03-19 PROCEDURE — 36415 COLL VENOUS BLD VENIPUNCTURE: CPT

## 2024-03-19 PROCEDURE — 83690 ASSAY OF LIPASE: CPT

## 2024-03-19 PROCEDURE — 82784 ASSAY IGA/IGD/IGG/IGM EACH: CPT

## 2024-03-19 PROCEDURE — 74177 CT ABD & PELVIS W/CONTRAST: CPT

## 2024-03-19 PROCEDURE — 86364 TISS TRNSGLTMNASE EA IG CLAS: CPT

## 2024-03-19 PROCEDURE — 87338 HPYLORI STOOL AG IA: CPT

## 2024-03-19 PROCEDURE — 85025 COMPLETE CBC W/AUTO DIFF WBC: CPT

## 2024-03-19 RX ADMIN — IOHEXOL 100 ML: 350 INJECTION, SOLUTION INTRAVENOUS at 13:17

## 2024-03-20 LAB
H PYLORI AG STL QL IA: NEGATIVE
TTG IGA SER-ACNC: <2 U/ML (ref 0–3)

## 2024-03-20 NOTE — RESULT ENCOUNTER NOTE
Please let patient know that her CT looks okay.  It shows her previous pancreatitis.  This is unchanged..  No acute findings found in the abdomen or pelvis.

## 2024-03-21 NOTE — RESULT ENCOUNTER NOTE
Please call patient and let her know that her celiac testing was negative.  She does not have H. pylori bacteria.  Her other labs looked okay.  Thanks.

## 2024-04-29 ENCOUNTER — TELEPHONE (OUTPATIENT)
Age: 79
End: 2024-04-29

## 2024-04-29 NOTE — TELEPHONE ENCOUNTER
Patients GI provider:  CHARITY Harvey    Number to return call: [phone]    Reason for call: Pt called for her lab results. Please reach out to pt    Scheduled procedure/appointment date if applicable: Apt/procedure 05/28/24

## 2024-07-26 ENCOUNTER — OFFICE VISIT (OUTPATIENT)
Dept: GASTROENTEROLOGY | Facility: MEDICAL CENTER | Age: 79
End: 2024-07-26
Payer: MEDICARE

## 2024-07-26 VITALS
DIASTOLIC BLOOD PRESSURE: 86 MMHG | BODY MASS INDEX: 31.42 KG/M2 | WEIGHT: 177.4 LBS | HEART RATE: 69 BPM | SYSTOLIC BLOOD PRESSURE: 122 MMHG | TEMPERATURE: 96.3 F | OXYGEN SATURATION: 98 %

## 2024-07-26 DIAGNOSIS — K59.09 CHRONIC CONSTIPATION: ICD-10-CM

## 2024-07-26 DIAGNOSIS — R10.10 UPPER ABDOMINAL PAIN: ICD-10-CM

## 2024-07-26 DIAGNOSIS — K21.9 GASTROESOPHAGEAL REFLUX DISEASE WITHOUT ESOPHAGITIS: Primary | ICD-10-CM

## 2024-07-26 PROCEDURE — 99214 OFFICE O/P EST MOD 30 MIN: CPT | Performed by: NURSE PRACTITIONER

## 2024-07-26 RX ORDER — FAMOTIDINE 40 MG/1
40 TABLET, FILM COATED ORAL DAILY
Qty: 30 TABLET | Refills: 3 | Status: SHIPPED | OUTPATIENT
Start: 2024-07-26

## 2024-07-26 NOTE — PROGRESS NOTES
Quality 431: Preventive Care And Screening: Unhealthy Alcohol Use - Screening: Patient screened for unhealthy alcohol use using a single question and scores less than 2 times per year [facility] - Outpatient Follow-up Note  Gabrielle Man 79 y.o. female MRN: [id number]  Encounter: [id number]          ASSESSMENT AND PLAN:      1.  Chronic constipation    History of chronic constipation.  Reports that she never took the Linzess.  She has increased her water intake and her BMs are every 2 to 3 days.  She feels like she is completely evacuating.  Denies any melena or hematochezia.  We did discuss MiraLAX 1 capful daily.  She reports that she is comfortable and she would like to hold on any other medications for constipation at this time.    -Continue fiber daily  -Continue water intake of 64 ounces daily  -MiraLAX 1 capful daily as needed       2.  Upper abdominal pain  3.  History of pancreatitis    Started several months ago with epigastric and right upper quadrant pain that radiates around to her right flank for several months.  Can be worse after eating.  She does get some nausea associated with it but no vomiting.  She is lost approximately 6 pounds in the past 3 months.  Appetite is decreased.  Does not recall when her last EGD was but it was at least more than 10 years ago.  She was on pantoprazole 20 mg twice daily but it was stopped.  She reports she is felt no different off of pantoprazole.  She does drink caffeine daily.  No NSAID use.  No alcohol use.  Recent CMP was normal other than creatinine of 1.42.  Most recent imaging was MRI 8/23 which noted diffuse intracellular lipid throughout the pancreas, similar to prior MRI since 9/22.  No evidence of pancreatic necrosis or studies dating back to 9/22. Stable intrahepatic and extrahepatic biliary ductal dilatation since 9/22.  No choledocholithiasis or suspicious mass.  Recent LFTs are normal.  She does have a history of gastric bypass done approximately 30 years ago.  No heartburn or reflux.  Recent CT noted No acute findings in the abdomen or pelvis. Diffusely hypodense pancreas consistent with sequelae of prior  Quality 226: Preventive Care And Screening: Tobacco Use: Screening And Cessation Intervention: Patient screened for tobacco use and is an ex/non-smoker pancreatitis, unchanged. Unchanged common bile duct dilation and mild intrahepatic biliary ductal dilation.  Scheduled for EGD next week.    -Start Pepcid 40 mg daily  -Antireflux diet  -EGD is scheduled  -Follow-up in office after testing  ______________________________________________________________________    SUBJECTIVE: 79-year-old female here for follow-up.  She was last seen by myself 3/8/2024 for history of chronic constipation, upper abdominal pain, nausea and history of pancreatitis.     History of chronic constipation. Reports that she never took the Linzess. She has increased her water intake and her BMs are every 2 to 3 days. She feels like she is completely evacuating. Denies any melena or hematochezia. We did discuss MiraLAX 1 capful daily. She reports that she is comfortable and she would like to hold on any other medications for constipation at this time.     Started several months ago with epigastric and right upper quadrant pain that radiates around to her right flank for several months.  Can be worse after eating.  She does get some nausea associated with it but no vomiting.  She is lost approximately 6 pounds in the past 3 months.  Appetite is decreased.  Does not recall when her last EGD was but it was at least more than 10 years ago.  She was on pantoprazole 20 mg twice daily but it was stopped.  She reports she is felt no different off of pantoprazole.  She does drink caffeine daily.  No NSAID use.  No alcohol use.  Recent CMP was normal other than creatinine of 1.42.  Most recent imaging was MRI 8/23 which noted diffuse intracellular lipid throughout the pancreas, similar to prior MRI since 9/22.  No evidence of pancreatic necrosis or studies dating back to 9/22. Stable intrahepatic and extrahepatic biliary ductal dilatation since 9/22.  No choledocholithiasis or suspicious mass.  Recent LFTs are normal.  She does have a history of gastric bypass done approximately 30 years ago.  No  Quality 111:Pneumonia Vaccination Status For Older Adults: Pneumococcal Vaccination Previously Received heartburn or reflux.     Interval history: Lipase was 9, stool for H. pylori was negative.  Still panel negative.  CT abdomen pelvis 3/24 noted No acute findings in the abdomen or pelvis. Diffusely hypodense pancreas consistent with sequelae of prior pancreatitis, unchanged. Unchanged common bile duct dilation and mild intrahepatic biliary ductal dilation.   She is scheduled for her EGD in a few weeks.  Still reporting intermittent bouts of epigastric pain that radiates to the right upper quadrant and occasionally to her back.  Can be worse after eating.  She is on any medications for heartburn/reflux.  Was on a PPI in the past with no help.  Denies any nausea, vomiting or dysphagia.    Prior EGD/colonoscopy     Colonoscopy 4/18-internal hemorrhoids otherwise normal exam.   REVIEW OF SYSTEMS IS OTHERWISE NEGATIVE.  10 point review of systems negative other than per HPI      Historical Information   Past Medical History:   Diagnosis Date   • Disease of thyroid gland    • GERD (gastroesophageal reflux disease)    • Hypertension      Past Surgical History:   Procedure Laterality Date   • HYSTERECTOMY     • REDUCTION MAMMAPLASTY Bilateral    • KAREEM-EN-Y PROCEDURE      open bypass and jeff Dr. Waite   • TONSILLECTOMY       Social History   Social History     Substance and Sexual Activity   Alcohol Use Not Currently     Social History     Substance and Sexual Activity   Drug Use Never     Social History     Tobacco Use   Smoking Status Never   Smokeless Tobacco Never     Family History   Problem Relation Age of Onset   • Hypertension Mother    • Heart disease Mother    • COPD Mother    • Deep vein thrombosis Mother    • Pancreatic cancer Maternal Grandfather    • Pancreatic cancer Maternal Aunt    • Colon cancer Maternal Uncle        Meds/Allergies       Current Outpatient Medications:   •  Biotin 1 MG CAPS  •  cyanocobalamin (VITAMIN B-12) 500 MCG tablet  •  ergocalciferol (ERGOCALCIFEROL) 1.25 MG (04120 UT) capsule  •   "famotidine (PEPCID) 40 MG tablet  •  HYDROcodone-acetaminophen (NORCO) 5-325 mg per tablet  •  Levothyroxine Sodium (SYNTHROID PO)  •  ondansetron (ZOFRAN-ODT) 4 mg disintegrating tablet  •  sertraline (ZOLOFT) 50 mg tablet  •  valsartan 160 mg TABS 160 mg, hydrochlorothiazide 12.5 mg TABS 12.5 mg  •  cholecalciferol (VITAMIN D3) 400 units tablet  •  escitalopram (LEXAPRO) 20 mg tablet  •  levothyroxine 75 mcg tablet  •  linaCLOtide (Linzess) 145 MCG CAPS  •  Pantoprazole Sodium (PROTONIX PO)  •  sucralfate (CARAFATE) 1 g tablet  •  valsartan (DIOVAN) 80 mg tablet    Allergies   Allergen Reactions   • Diclofenac Sodium Hives   • Sulfamethoxazole-Trimethoprim Hives and Swelling   • Prednisone Hives and GI Intolerance     \"sweling\" in face  Redness of face   Swelling cheeks,face red             Objective     Blood pressure 122/86, pulse 69, temperature (!) 96.3 °F (35.7 °C), temperature source Tympanic, weight 80.5 kg (177 lb 6.4 oz), SpO2 98%. Body mass index is 31.42 kg/m².      PHYSICAL EXAM:      General Appearance:   Alert, cooperative, no distress   HEENT:   Normocephalic, atraumatic, anicteric.     Neck:  Supple, symmetrical, trachea midline   Lungs:   Clear to auscultation bilaterally; no rales, rhonchi or wheezing; respirations unlabored    Heart::   Regular rate and rhythm; no murmur, rub, or gallop.   Abdomen:   Soft, non-tender, non-distended; normal bowel sounds; no masses, no organomegaly    Genitalia:   Deferred    Rectal:   Deferred    Extremities:  No cyanosis, clubbing or edema    Pulses:  2+ and symmetric    Skin:  No jaundice, rashes, or lesions    Lymph nodes:  No palpable cervical lymphadenopathy        Lab Results:   No visits with results within 1 Day(s) from this visit.   Latest known visit with results is:   Lab on 03/19/2024   Component Date Value   • H pylori Ag, Stl 03/19/2024 Negative    • TISSUE TRANSGLUTAMINASE * 03/19/2024 <2    • IGA 03/19/2024 256    • Lipase 03/19/2024 9 (L)    • " Quality 47: Advance Care Plan: Advance Care Planning discussed and documented in the medical record; patient did not wish or was not able to name a surrogate decision maker or provide an advance care plan. Quality 130: Documentation Of Current Medications In The Medical Record: Current Medications Documented WBC 03/19/2024 3.84 (L)    • RBC 03/19/2024 3.99    • Hemoglobin 03/19/2024 11.8    • Hematocrit 03/19/2024 36.8    • MCV 03/19/2024 92    • MCH 03/19/2024 29.6    • MCHC 03/19/2024 32.1    • RDW 03/19/2024 13.6    • MPV 03/19/2024 10.4    • Platelets 03/19/2024 232    • nRBC 03/19/2024 0    • Segmented % 03/19/2024 63    • Immature Grans % 03/19/2024 1    • Lymphocytes % 03/19/2024 19    • Monocytes % 03/19/2024 7    • Eosinophils Relative 03/19/2024 9 (H)    • Basophils Relative 03/19/2024 1    • Absolute Neutrophils 03/19/2024 2.44    • Absolute Immature Grans 03/19/2024 0.02    • Absolute Lymphocytes 03/19/2024 0.72    • Absolute Monocytes 03/19/2024 0.27    • Eosinophils Absolute 03/19/2024 0.35    • Basophils Absolute 03/19/2024 0.04          Radiology Results:   No results found.   Detail Level: Detailed

## 2024-08-19 DIAGNOSIS — K21.9 GASTROESOPHAGEAL REFLUX DISEASE WITHOUT ESOPHAGITIS: ICD-10-CM

## 2024-08-20 RX ORDER — FAMOTIDINE 40 MG/1
40 TABLET, FILM COATED ORAL DAILY
Qty: 100 TABLET | Refills: 1 | Status: SHIPPED | OUTPATIENT
Start: 2024-08-20

## 2024-08-23 ENCOUNTER — HOSPITAL ENCOUNTER (OUTPATIENT)
Dept: GASTROENTEROLOGY | Facility: HOSPITAL | Age: 79
Setting detail: OUTPATIENT SURGERY
End: 2024-08-23
Attending: INTERNAL MEDICINE
Payer: MEDICARE

## 2024-08-23 ENCOUNTER — ANESTHESIA EVENT (OUTPATIENT)
Dept: GASTROENTEROLOGY | Facility: HOSPITAL | Age: 79
End: 2024-08-23

## 2024-08-23 ENCOUNTER — ANESTHESIA (OUTPATIENT)
Dept: GASTROENTEROLOGY | Facility: HOSPITAL | Age: 79
End: 2024-08-23

## 2024-08-23 VITALS
DIASTOLIC BLOOD PRESSURE: 55 MMHG | HEART RATE: 68 BPM | WEIGHT: 170 LBS | HEIGHT: 63 IN | BODY MASS INDEX: 30.12 KG/M2 | OXYGEN SATURATION: 100 % | SYSTOLIC BLOOD PRESSURE: 96 MMHG | TEMPERATURE: 96.9 F | RESPIRATION RATE: 17 BRPM

## 2024-08-23 DIAGNOSIS — R63.4 WEIGHT LOSS, ABNORMAL: Primary | ICD-10-CM

## 2024-08-23 DIAGNOSIS — R10.13 EPIGASTRIC PAIN: ICD-10-CM

## 2024-08-23 DIAGNOSIS — K86.1 OTHER CHRONIC PANCREATITIS (HCC): ICD-10-CM

## 2024-08-23 PROCEDURE — 43239 EGD BIOPSY SINGLE/MULTIPLE: CPT | Performed by: INTERNAL MEDICINE

## 2024-08-23 PROCEDURE — 88305 TISSUE EXAM BY PATHOLOGIST: CPT | Performed by: PATHOLOGY

## 2024-08-23 RX ORDER — PROPOFOL 10 MG/ML
INJECTION, EMULSION INTRAVENOUS AS NEEDED
Status: DISCONTINUED | OUTPATIENT
Start: 2024-08-23 | End: 2024-08-23

## 2024-08-23 RX ORDER — SODIUM CHLORIDE 9 MG/ML
INJECTION, SOLUTION INTRAVENOUS CONTINUOUS PRN
Status: DISCONTINUED | OUTPATIENT
Start: 2024-08-23 | End: 2024-08-23

## 2024-08-23 RX ORDER — LIDOCAINE HYDROCHLORIDE 20 MG/ML
INJECTION, SOLUTION EPIDURAL; INFILTRATION; INTRACAUDAL; PERINEURAL AS NEEDED
Status: DISCONTINUED | OUTPATIENT
Start: 2024-08-23 | End: 2024-08-23

## 2024-08-23 RX ADMIN — PROPOFOL 30 MG: 10 INJECTION, EMULSION INTRAVENOUS at 13:20

## 2024-08-23 RX ADMIN — SODIUM CHLORIDE: 0.9 INJECTION, SOLUTION INTRAVENOUS at 13:12

## 2024-08-23 RX ADMIN — PROPOFOL 100 MG: 10 INJECTION, EMULSION INTRAVENOUS at 13:19

## 2024-08-23 RX ADMIN — LIDOCAINE HYDROCHLORIDE 20 MG: 20 INJECTION, SOLUTION EPIDURAL; INFILTRATION; INTRACAUDAL at 13:25

## 2024-08-23 RX ADMIN — LIDOCAINE HYDROCHLORIDE 80 MG: 20 INJECTION, SOLUTION EPIDURAL; INFILTRATION; INTRACAUDAL at 13:20

## 2024-08-23 RX ADMIN — PROPOFOL 30 MG: 10 INJECTION, EMULSION INTRAVENOUS at 13:23

## 2024-08-23 RX ADMIN — PROPOFOL 30 MG: 10 INJECTION, EMULSION INTRAVENOUS at 13:25

## 2024-08-23 NOTE — ANESTHESIA POSTPROCEDURE EVALUATION
Post-Op Assessment Note    CV Status:  Stable  Pain Score: 0    Pain management: adequate       Mental Status:  Sleepy   PONV Controlled:  None   Airway Patency:  Patent    No anethesia notable event occurred.    Staff: KEATON               /57 (08/23/24 1329)    Temp (!) 96.9 °F (36.1 °C) (08/23/24 1329)    Pulse 84 (08/23/24 1329)   Resp 17 (08/23/24 1329)    SpO2 100 % (08/23/24 1329)

## 2024-08-23 NOTE — H&P
History and Physical - [facility]  Gabrielle Man 79 y.o. female MRN: [id number]                  HPI: Gabrielle Man is a 79 y.o. year old female who presents for abdominal pain, history of pancreatitis.      REVIEW OF SYSTEMS: Per the HPI, and otherwise unremarkable.    Historical Information   Past Medical History:   Diagnosis Date    Disease of thyroid gland     GERD (gastroesophageal reflux disease)     Hypertension      Past Surgical History:   Procedure Laterality Date    HYSTERECTOMY      REDUCTION MAMMAPLASTY Bilateral     KAREEM-EN-Y PROCEDURE      open bypass and jeff Dr. Waite    TONSILLECTOMY       Social History   Social History     Substance and Sexual Activity   Alcohol Use Not Currently     Social History     Substance and Sexual Activity   Drug Use Never     Social History     Tobacco Use   Smoking Status Never   Smokeless Tobacco Never     Family History   Problem Relation Age of Onset    Hypertension Mother     Heart disease Mother     COPD Mother     Deep vein thrombosis Mother     Pancreatic cancer Maternal Grandfather     Pancreatic cancer Maternal Aunt     Colon cancer Maternal Uncle        Meds/Allergies       Current Outpatient Medications:     Biotin 1 MG CAPS    cholecalciferol (VITAMIN D3) 400 units tablet    cyanocobalamin (VITAMIN B-12) 500 MCG tablet    ergocalciferol (ERGOCALCIFEROL) 1.25 MG (98509 UT) capsule    escitalopram (LEXAPRO) 20 mg tablet    famotidine (PEPCID) 40 MG tablet    HYDROcodone-acetaminophen (NORCO) 5-325 mg per tablet    levothyroxine 75 mcg tablet    Levothyroxine Sodium (SYNTHROID PO)    linaCLOtide (Linzess) 145 MCG CAPS    ondansetron (ZOFRAN-ODT) 4 mg disintegrating tablet    Pantoprazole Sodium (PROTONIX PO)    sertraline (ZOLOFT) 50 mg tablet    sucralfate (CARAFATE) 1 g tablet    valsartan (DIOVAN) 80 mg tablet    valsartan 160 mg TABS 160 mg, hydrochlorothiazide 12.5 mg TABS 12.5 mg    Allergies   Allergen Reactions    Diclofenac Sodium  "Hives    Sulfamethoxazole-Trimethoprim Hives and Swelling    Prednisone Hives and GI Intolerance     \"sweling\" in face  Redness of face   Swelling cheeks,face red         Objective     There were no vitals taken for this visit.      PHYSICAL EXAM    Gen: NAD  Head: NCAT  CV: RRR  CHEST: Clear  ABD: soft, NT/ND  EXT: no edema      ASSESSMENT/PLAN:  This is a 79 y.o. year old female here for EGD, and she is stable and optimized for her procedure.        "

## 2024-08-23 NOTE — ANESTHESIA PREPROCEDURE EVALUATION
Procedure:  EGD    Relevant Problems   ANESTHESIA (within normal limits)      CARDIO   (+) Primary hypertension      ENDO   (+) Acquired hypothyroidism      GI/HEPATIC   (+) Acute pancreatitis   (+) Other chronic pancreatitis (HCC)      /RENAL   (+) CKD (chronic kidney disease) stage 3, GFR 30-59 ml/min (HCC)      GYN (within normal limits)      HEMATOLOGY (within normal limits)      MUSCULOSKELETAL (within normal limits)      NEURO/PSYCH   (+) Chronic pain syndrome      PULMONARY (within normal limits)        Physical Exam    Airway    Mallampati score: IV  TM Distance: >3 FB  Neck ROM: limited     Dental    lower dentures and upper dentures    Cardiovascular  Cardiovascular exam normal    Pulmonary  Pulmonary exam normal     Other Findings  post-pubertal.      Anesthesia Plan  ASA Score- 2     Anesthesia Type- IV sedation with anesthesia with ASA Monitors.         Additional Monitors:     Airway Plan:            Plan Factors-Exercise tolerance (METS): >4 METS.    Chart reviewed.  Imaging results reviewed. Existing labs reviewed. Patient summary reviewed.    Patient is not a current smoker.              Induction-     Postoperative Plan-     Perioperative Resuscitation Plan - Level 1 - Full Code.       Informed Consent- Anesthetic plan and risks discussed with patient.  I personally reviewed this patient with the CRNA. Discussed and agreed on the Anesthesia Plan with the CRNA..      Discussed IV Sedation with General Anesthesia as backup with patient including but not limited to risk of cardiac insult, pulmonary complication, stroke, reaction to medications and death. All questions answered and consent was obtained.

## 2024-08-26 ENCOUNTER — TELEPHONE (OUTPATIENT)
Age: 79
End: 2024-08-26

## 2024-08-26 NOTE — TELEPHONE ENCOUNTER
Pt. Called asking fo a new CT scan order to be placed, pt. Was ordered CT chest abdomen pelvis w contrast but was not told to get blood work and when she called central scheduling today she was under the impression she was getting it done without contrast, please advise

## 2024-08-27 PROCEDURE — 88305 TISSUE EXAM BY PATHOLOGIST: CPT | Performed by: PATHOLOGY

## 2024-08-27 NOTE — TELEPHONE ENCOUNTER
Document seen, I see you ordered this CT.  Just confirming that you wanted contrast for patient clarification.

## 2024-08-28 ENCOUNTER — TELEPHONE (OUTPATIENT)
Dept: GASTROENTEROLOGY | Facility: CLINIC | Age: 79
End: 2024-08-28

## 2024-08-28 DIAGNOSIS — R63.4 WEIGHT LOSS, ABNORMAL: Primary | ICD-10-CM

## 2024-08-28 NOTE — RESULT ENCOUNTER NOTE
Hi all  I spoke to her about the biopsies.  I have encouraged her to obtain the CT abdomen.  As per my discussion of the procedure with Dr. Morrissey, we are unable to use contrast given her CKD.  I have ordered another scan for her without contrast.  I have asked her to schedule it.

## 2024-09-04 ENCOUNTER — HOSPITAL ENCOUNTER (OUTPATIENT)
Dept: CT IMAGING | Facility: HOSPITAL | Age: 79
Discharge: HOME | End: 2024-09-04
Payer: MEDICARE

## 2024-09-04 ENCOUNTER — APPOINTMENT (OUTPATIENT)
Dept: LAB | Facility: HOSPITAL | Age: 79
End: 2024-09-04
Payer: MEDICARE

## 2024-09-04 DIAGNOSIS — R63.4 WEIGHT LOSS, ABNORMAL: ICD-10-CM

## 2024-09-04 PROCEDURE — 71250 CT THORAX DX C-: CPT

## 2024-09-04 PROCEDURE — 74176 CT ABD & PELVIS W/O CONTRAST: CPT

## 2024-09-09 ENCOUNTER — APPOINTMENT (OUTPATIENT)
Dept: LAB | Facility: HOSPITAL | Age: 79
End: 2024-09-09
Payer: MEDICARE

## 2024-09-09 DIAGNOSIS — K86.1 OTHER CHRONIC PANCREATITIS (HCC): ICD-10-CM

## 2024-09-09 DIAGNOSIS — R63.4 WEIGHT LOSS, ABNORMAL: ICD-10-CM

## 2024-09-09 PROCEDURE — 82653 EL-1 FECAL QUANTITATIVE: CPT

## 2024-09-12 LAB — ELASTASE PANC STL-MCNT: >800 UG/G

## 2024-09-12 NOTE — RESULT ENCOUNTER NOTE
I called and spoke to the patient about the results of CT chest abdomen pelvis without contrast done on 9//24.  Discussed that results were normal.  She reports that overall her weight has been stable around 170 pounds for the last few months.  Denies any major GI related complaints.  She had fecal pancreatic elastase obtained which is still in processing.  Will reach out to her separately once results are back.

## 2024-12-11 ENCOUNTER — OFFICE VISIT (OUTPATIENT)
Dept: GASTROENTEROLOGY | Facility: MEDICAL CENTER | Age: 79
End: 2024-12-11
Payer: MEDICARE

## 2024-12-11 VITALS
TEMPERATURE: 97.8 F | HEART RATE: 62 BPM | HEIGHT: 63 IN | DIASTOLIC BLOOD PRESSURE: 82 MMHG | BODY MASS INDEX: 30.12 KG/M2 | WEIGHT: 170 LBS | SYSTOLIC BLOOD PRESSURE: 125 MMHG

## 2024-12-11 DIAGNOSIS — R63.4 WEIGHT LOSS, ABNORMAL: ICD-10-CM

## 2024-12-11 DIAGNOSIS — K59.09 CHRONIC CONSTIPATION: Primary | ICD-10-CM

## 2024-12-11 PROCEDURE — 99214 OFFICE O/P EST MOD 30 MIN: CPT | Performed by: NURSE PRACTITIONER

## 2024-12-11 NOTE — PROGRESS NOTES
Name: Gabrielle Man      : 1945      MRN: [id number]  Encounter Provider: CHARITY Cardenas  Encounter Date: 2024   Encounter department: [facility]  :  Assessment & Plan  Chronic constipation  History of chronic constipation. Reports that she never took the Linzess. She has increased her water intake and her BMs are every 2 to 3 days. She feels like she is completely evacuating. Denies any melena or hematochezia.  She has tried MiraLAX 1 capful daily to twice daily but it caused bloating.  BMs are hard and large in caliber at times.  She did use Metamucil powder which caused bloating.  She will be starting a gummy fiber shortly.  We did discuss Linzess treatment.  She is going to try to increase her water intake and fiber in her diet.  She will contact office if that does not help with her constipation.    -High-fiber diet  -Fiber supplement daily  -Consider Linzess if constipation persists       Weight loss, abnormal  She is lost approximately 16 pounds in the past several months.  Reports that her appetite is decreased.  She does have a history of gastric bypass.  Repeat [facility] EGD noted nonobstructive Schatzki's ring, 2 cm hiatal hernia.  Gastric polyps appeared normal.  Anastomotic site normal.  Recent CT abdomen pelvis showed no new abnormalities.  She does have persistent Diffusely hypodense pancreas consistent with sequela of prior pancreatitis, unchanged.  Unchanged common bile duct dilation and mild intrahepatic biliary ductal dilation.  Appetite is decreased.  He did discuss appetite may be decreased due to history of constipation.    -Continue to monitor weight  -Consider Linzess if high-fiber diet and fiber supplement do not help  -Follow-up in office in 4 to 6 months  -Consider repeat colonoscopy           History of Present Illness   HPI  Gabrielle Man is a 79 y.o. female who presents for follow-up.  Last seen by myself  for chronic  constipation, upper abdominal pain and history of pancreatitis.  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 Reports that she never took the Linzess. She has increased her water intake and her BMs are every 2 to 3 days. She feels like she is completely evacuating. Denies any melena or hematochezia.  She has tried MiraLAX 1 capful daily to twice daily but it caused bloating.  BMs are hard and large in caliber at times.  She did use Metamucil powder which caused bloating.  She will be starting a gummy fiber shortly.        Labs 10/24-CMP normal other than creatinine 1.32.    CT chest abdomen pelvis 9/24-no acute findings in the chest abdomen or pelvis.  Unchanged prominence of common bile duct and central intrahepatic biliary tree, most likely sequela of prior cholecystectomy.    CT chest abdomen pelvis 3/24-no acute findings.  Diffusely hypodense pancreas consistent with sequela of prior pancreatitis, unchanged.  Unchanged common bile duct dilation and mild intrahepatic biliary ductal dilation    Prior EGD/colonoscopy      EGD 8/24-nonobstructive Schatzki's ring in the GE junction.  2 cm hiatal hernia.  Gastric pouch appeared normal.  GJ anastomosis appeared normal.  Otherwise normal exam.  Biopsies negative for intestinal metaplasia and H. pylori.  Biopsies negative for celiac.    Colonoscopy 4/18-internal hemorrhoids otherwise normal exam.       History obtained from: patient    Review of Systems   Gastrointestinal:  Positive for constipation.   All other systems reviewed and are negative.    Medical History Reviewed by provider this encounter:  Tobacco  Allergies  Meds  Problems  Med Hx  Surg Hx  Fam Hx     .  Current Outpatient Medications on File Prior to Visit   Medication Sig Dispense Refill   • Biotin 1 MG CAPS Take by mouth     • cholecalciferol (VITAMIN D3) 400 units tablet Take 400 Units by mouth daily     • ergocalciferol (ERGOCALCIFEROL) 1.25 MG (69359 UT)  "capsule Take 50,000 Units by mouth once a week     • famotidine (PEPCID) 40 MG tablet TAKE 1 TABLET BY MOUTH EVERY  tablet 1   • HYDROcodone-acetaminophen (NORCO) 5-325 mg per tablet Take 1 tablet by mouth 2 (two) times a day     • levothyroxine 75 mcg tablet Take 75 mcg by mouth daily Unsure of the correct dosage     • ondansetron (ZOFRAN-ODT) 4 mg disintegrating tablet Take 1 tablet by mouth every 8 (eight) hours as needed     • sertraline (ZOLOFT) 50 mg tablet Take 50 mg by mouth daily     • valsartan 160 mg TABS 160 mg, hydrochlorothiazide 12.5 mg TABS 12.5 mg Take by mouth daily     • cyanocobalamin (VITAMIN B-12) 500 MCG tablet Take 500 mcg by mouth daily (Patient not taking: Reported on 12/11/2024)       No current facility-administered medications on file prior to visit.      Social History     Tobacco Use   • Smoking status: Never   • Smokeless tobacco: Never   Vaping Use   • Vaping status: Never Used   Substance and Sexual Activity   • Alcohol use: Not Currently   • Drug use: Never   • Sexual activity: Not on file        Objective   /82 (BP Location: Left arm)   Pulse 62   Temp 97.8 °F (36.6 °C)   Ht 5' 2.5\" (1.588 m)   Wt 77.1 kg (170 lb)   BMI 30.60 kg/m²      Physical Exam  Vitals reviewed.   Constitutional:       Appearance: Normal appearance.   Abdominal:      General: Bowel sounds are normal.      Palpations: Abdomen is soft.   Neurological:      Mental Status: She is alert and oriented to person, place, and time.           "

## 2024-12-18 DIAGNOSIS — K21.9 GASTROESOPHAGEAL REFLUX DISEASE WITHOUT ESOPHAGITIS: ICD-10-CM

## 2024-12-18 RX ORDER — FAMOTIDINE 40 MG/1
40 TABLET, FILM COATED ORAL DAILY
Qty: 90 TABLET | Refills: 1 | Status: SHIPPED | OUTPATIENT
Start: 2024-12-18

## 2025-07-08 ENCOUNTER — OFFICE VISIT (OUTPATIENT)
Dept: GASTROENTEROLOGY | Facility: MEDICAL CENTER | Age: 80
End: 2025-07-08
Payer: MEDICARE

## 2025-07-08 VITALS
HEART RATE: 60 BPM | OXYGEN SATURATION: 98 % | HEIGHT: 63 IN | DIASTOLIC BLOOD PRESSURE: 82 MMHG | BODY MASS INDEX: 30.37 KG/M2 | TEMPERATURE: 97.1 F | SYSTOLIC BLOOD PRESSURE: 149 MMHG | WEIGHT: 171.4 LBS

## 2025-07-08 DIAGNOSIS — K59.09 CHRONIC CONSTIPATION: ICD-10-CM

## 2025-07-08 DIAGNOSIS — R63.4 WEIGHT LOSS, ABNORMAL: Primary | ICD-10-CM

## 2025-07-08 PROCEDURE — 99213 OFFICE O/P EST LOW 20 MIN: CPT | Performed by: NURSE PRACTITIONER

## 2025-07-08 NOTE — PROGRESS NOTES
Name: Gabrielle Man      : 1945      MRN: [id number]  Encounter Provider: CHARITY Cardenas  Encounter Date: 2025   Encounter department: [facility]  :  Assessment & Plan  Weight loss, abnormal  She lost approximately 16 pounds prior to last visit.  Weight has been stable and she has gained 1 pound since that time.  Reports her appetite has been decreased overall.  She does have a history of a 2 cm hiatal hernia and a Schatzki's ring.  Has rare episodes of mid esophageal dysphagia.  We did discuss setting up another EGD.  She would like to hold on that at this time and call the office if she changes her mind.    Monitor weight         Chronic constipation  History of chronic constipation.  She is not taking Metamucil on a daily basis but if she misses a day of a BM she will take Metamucil and have a BM.  I did stress importance of taking Metamucil on a daily basis.  No melena or hematochezia.  MiraLAX caused bloating.    Metamucil daily  High-fiber diet  Follow-up in office in 6 months or sooner if needed  Consider Linzess if fiber and stool softeners do not help           History of Present Illness   Gabrielle Man is a 80 y.o. female who presents for follow-up.  She was last seen by myself  for chronic constipation and weight loss.    HPI    History of chronic constipation. Reports that she never took the Linzess. She has increased her water intake and her BMs are every 2 to 3 days. She feels like she is completely evacuating. Denies any melena or hematochezia.  She has tried MiraLAX 1 capful daily to twice daily but it caused bloating.  BMs are hard and large in caliber at times.  She did use Metamucil powder which caused bloating.  She will be starting a gummy fiber shortly.     She is lost approximately 16 pounds in the past several months.  Reports that her appetite is decreased.  She does have a history of gastric bypass.  Repeat  EGD noted  nonobstructive Schatzki's ring, 2 cm hiatal hernia.  Gastric polyps appeared normal.  Anastomotic site normal.  Recent CT abdomen pelvis showed no new abnormalities.  She does have persistent Diffusely hypodense pancreas consistent with sequela of prior pancreatitis, unchanged.  Unchanged common bile duct dilation and mild intrahepatic biliary ductal dilation.  Appetite is decreased.     Interval history: Has not been taking Metamucil on a daily basis.  When she misses a day or 2 of a BM she takes the Metamucil and she does have a BM.  No melena or hematochezia.  Weight has been stable.  She has gained 1 pound in the past 6 months.  Reports her appetite is okay.  No nausea, vomiting or dysphagia.    Labs 3/25-CMP normal other than creatinine 1.07 with GFR of 52.1       CT chest abdomen pelvis 9/24-no acute findings in the chest abdomen or pelvis.  Unchanged prominence of common bile duct and central intrahepatic biliary tree, most likely sequela of prior cholecystectomy.     CT chest abdomen pelvis 3/24-no acute findings.  Diffusely hypodense pancreas consistent with sequela of prior pancreatitis, unchanged.  Unchanged common bile duct dilation and mild intrahepatic biliary ductal dilation     Prior EGD/colonoscopy      EGD 8/24-nonobstructive Schatzki's ring in the GE junction.  2 cm hiatal hernia.  Gastric pouch appeared normal.  GJ anastomosis appeared normal.  Otherwise normal exam.  Biopsies negative for intestinal metaplasia and H. pylori.  Biopsies negative for celiac.     Colonoscopy 4/18-internal hemorrhoids otherwise normal exam.       Review of Systems   Constitutional:  Negative for chills and fever.   HENT:  Negative for ear pain and sore throat.    Eyes:  Negative for pain and visual disturbance.   Respiratory:  Negative for cough and shortness of breath.    Cardiovascular:  Negative for chest pain and palpitations.   Gastrointestinal:  Positive for constipation. Negative for abdominal pain and  "vomiting.   Genitourinary:  Negative for dysuria and hematuria.   Musculoskeletal:  Negative for arthralgias and back pain.   Skin:  Negative for color change and rash.   Neurological:  Negative for seizures and syncope.   All other systems reviewed and are negative.   A complete review of systems is negative other than that noted above in the HPI.      Current Medications[1]  Objective   /82 (BP Location: Left arm)   Pulse 60   Temp (!) 97.1 °F (36.2 °C)   Ht 5' 2.5\" (1.588 m)   Wt 77.7 kg (171 lb 6.4 oz)   SpO2 98%   BMI 30.85 kg/m²     Physical Exam  Vitals and nursing note reviewed.   Constitutional:       General: She is not in acute distress.     Appearance: She is well-developed.   HENT:      Head: Normocephalic and atraumatic.     Eyes:      Conjunctiva/sclera: Conjunctivae normal.       Cardiovascular:      Rate and Rhythm: Normal rate and regular rhythm.      Heart sounds: No murmur heard.  Pulmonary:      Effort: Pulmonary effort is normal. No respiratory distress.      Breath sounds: Normal breath sounds.   Abdominal:      General: Bowel sounds are normal.      Palpations: Abdomen is soft.      Tenderness: There is no abdominal tenderness.     Musculoskeletal:         General: No swelling.      Cervical back: Neck supple.     Skin:     General: Skin is warm and dry.      Capillary Refill: Capillary refill takes less than 2 seconds.     Neurological:      Mental Status: She is alert and oriented to person, place, and time.     Psychiatric:         Mood and Affect: Mood normal.            Lab Results: I personally reviewed relevant lab results.       Results for orders placed during the hospital encounter of 08/23/24    EGD    Impression  The esophagus appeared normal.  Non obstructive Schatzki ring in the GE junction  2 cm hiatal hernia  The gastric pouch appeared normal.  The gastrojejunal anastomosis appeared normal.  The jejunum appeared normal. Performed random " biopsy.    RECOMMENDATION:  Await pathology results  Follow up with [facility] Clinic            Robbin Morrissey MD               [1]   Current Outpatient Medications   Medication Sig Dispense Refill    Biotin 1 MG CAPS Take by mouth      cholecalciferol (VITAMIN D3) 400 units tablet Take 400 Units by mouth in the morning.      ergocalciferol (ERGOCALCIFEROL) 1.25 MG (23123 UT) capsule Take 50,000 Units by mouth once a week      famotidine (PEPCID) 40 MG tablet TAKE 1 TABLET BY MOUTH EVERY DAY 90 tablet 1    HYDROcodone-acetaminophen (NORCO) 5-325 mg per tablet Take 1 tablet by mouth in the morning and 1 tablet in the evening.      levothyroxine 75 mcg tablet Take 75 mcg by mouth in the morning. Unsure of the correct dosage.      ondansetron (ZOFRAN-ODT) 4 mg disintegrating tablet Take 1 tablet by mouth every 8 (eight) hours as needed      sertraline (ZOLOFT) 50 mg tablet Take 50 mg by mouth in the morning.      valsartan 160 mg TABS 160 mg, hydrochlorothiazide 12.5 mg TABS 12.5 mg Take by mouth in the morning.      cyanocobalamin (VITAMIN B-12) 500 MCG tablet Take 500 mcg by mouth daily (Patient not taking: Reported on 7/8/2025)       No current facility-administered medications for this visit.

## 2025-07-11 ENCOUNTER — OFFICE VISIT (OUTPATIENT)
Dept: OBGYN CLINIC | Facility: CLINIC | Age: 80
End: 2025-07-11
Payer: MEDICARE

## 2025-07-11 ENCOUNTER — HOSPITAL ENCOUNTER (OUTPATIENT)
Dept: RADIOLOGY | Facility: CLINIC | Age: 80
End: 2025-07-11
Attending: ORTHOPAEDIC SURGERY
Payer: MEDICARE

## 2025-07-11 VITALS — HEIGHT: 63 IN | WEIGHT: 174 LBS | BODY MASS INDEX: 30.83 KG/M2

## 2025-07-11 DIAGNOSIS — M25.562 CHRONIC PAIN OF BOTH KNEES: ICD-10-CM

## 2025-07-11 DIAGNOSIS — M25.561 CHRONIC PAIN OF BOTH KNEES: ICD-10-CM

## 2025-07-11 DIAGNOSIS — G89.29 CHRONIC PAIN OF BOTH KNEES: ICD-10-CM

## 2025-07-11 DIAGNOSIS — M17.0 PRIMARY OSTEOARTHRITIS OF BOTH KNEES: Primary | ICD-10-CM

## 2025-07-11 PROCEDURE — 73564 X-RAY EXAM KNEE 4 OR MORE: CPT

## 2025-07-11 PROCEDURE — 99204 OFFICE O/P NEW MOD 45 MIN: CPT | Performed by: ORTHOPAEDIC SURGERY

## 2025-07-11 PROCEDURE — 20610 DRAIN/INJ JOINT/BURSA W/O US: CPT | Performed by: ORTHOPAEDIC SURGERY

## 2025-07-11 RX ORDER — METHENAMINE HIPPURATE 1000 MG/1
TABLET ORAL 2 TIMES DAILY
COMMUNITY
Start: 2025-06-02

## 2025-07-11 RX ORDER — BUPIVACAINE HYDROCHLORIDE 2.5 MG/ML
4 INJECTION, SOLUTION INFILTRATION; PERINEURAL
Status: COMPLETED | OUTPATIENT
Start: 2025-07-11 | End: 2025-07-11

## 2025-07-11 RX ORDER — VALSARTAN 160 MG/1
160 TABLET ORAL DAILY
COMMUNITY
Start: 2025-02-26 | End: 2026-02-26

## 2025-07-11 RX ORDER — TRIAMCINOLONE ACETONIDE 40 MG/ML
40 INJECTION, SUSPENSION INTRA-ARTICULAR; INTRAMUSCULAR
Status: COMPLETED | OUTPATIENT
Start: 2025-07-11 | End: 2025-07-11

## 2025-07-11 RX ORDER — SUCRALFATE 1 G/1
TABLET ORAL
COMMUNITY

## 2025-07-11 RX ORDER — MONTELUKAST SODIUM 10 MG/1
10 TABLET ORAL DAILY
COMMUNITY
Start: 2025-04-05

## 2025-07-11 RX ADMIN — BUPIVACAINE HYDROCHLORIDE 4 ML: 2.5 INJECTION, SOLUTION INFILTRATION; PERINEURAL at 15:00

## 2025-07-11 RX ADMIN — TRIAMCINOLONE ACETONIDE 40 MG: 40 INJECTION, SUSPENSION INTRA-ARTICULAR; INTRAMUSCULAR at 15:00

## 2025-07-11 NOTE — ASSESSMENT & PLAN NOTE
Treatment options were reviewed.  She elected to proceed with injection of both knees with corticosteroid and local anesthetic.  This was performed without difficulty and tolerated well.  Follow-up was discussed and she would prefer to return in 2 to 3 weeks for reevaluation.  I have encouraged her to contact the office if any questions or concerns arise.      Orders:    XR knee 4+ vw right injury; Future    XR knee 4+ vw left injury; Future

## 2025-07-11 NOTE — PROGRESS NOTES
Assessment & Plan  Primary osteoarthritis of both knees  Treatment options were reviewed.  She elected to proceed with injection of both knees with corticosteroid and local anesthetic.  This was performed without difficulty and tolerated well.  Follow-up was discussed and she would prefer to return in 2 to 3 weeks for reevaluation.  I have encouraged her to contact the office if any questions or concerns arise.      Orders:    XR knee 4+ vw right injury; Future    XR knee 4+ vw left injury; Future        Return for 2 to 3 weeks.      _____________________________________________________  CHIEF COMPLAINT:  Chief Complaint   Patient presents with    Right Knee - Pain, New Patient Visit    Left Knee - Pain, New Patient Visit         SUBJECTIVE:  Gabrielle Man is a 80 y.o. year old female who presents for initial evaluation of bilateral knee pain. She has had pain in her anterior knees for a few months. She has stiffness and soreness first thing in the morning. She has increased pain the longer she is active on her feet. She does get swelling in her knees which is dependent on her activity level. She notes she had injections 10-12yrs ago. She has a history of neuropathy in her feet.      PAST MEDICAL HISTORY:  Past Medical History[1]    PAST SURGICAL HISTORY:  Past Surgical History[2]    FAMILY HISTORY:  Family History[3]    SOCIAL HISTORY:  Social History[4]    MEDICATIONS:  Current Medications[5]    ALLERGIES:  Allergies[6]    Review of systems:   Constitutional: Negative for fatigue, fever or loss of apetite.   HENT: Negative.    Respiratory: Negative for shortness of breath, dyspnea.    Cardiovascular: Negative for chest pain/tightness.   Gastrointestinal: Negative for abdominal pain, N/V.   Endocrine: Negative for cold/heat intolerance, unexplained weight loss/gain.   Genitourinary: Negative for flank pain, dysuria, hematuria.   Musculoskeletal: As noted in HPI   Skin: Negative for rash.    Neurological:  "Negative  Psychiatric/Behavioral: Negative for agitation.  _____________________________________________________  PHYSICAL EXAMINATION:    Height 5' 2.5\" (1.588 m), weight 78.9 kg (174 lb).    General: well developed and well nourished, alert, oriented times 3, and appears comfortable  Psychiatric: Normal  HEENT: Benign  Cardiovascular: Regular    Pulmonary: No wheezing or stridor  Abdomen: Soft, Nontender  Skin: No masses, erythema, lacerations, fluctation, ulcerations  Neurovascular: Sensory and motor grossly intact    MUSCULOSKELETAL EXAMINATION:    bilateral knee(s) -   Patient ambulates with steady gait pattern  Uses No assistive device  No anatomical deformity  Skin is warm and dry to touch with no signs of erythema, ecchymosis, or infection   No soft tissue swelling or effusion noted  ROM (0° - 115°) pain with deep flexion  TTP over medial joint line  Flexor and extensor mechanisms are intact   Knee is mildly lax to valgus stress  Patella tracks centrally without palpable crepitus  Calf compartments are soft and supple  - Edwin's sign  2+ DP and PT pulses with brisk capillary refill to the toes  Sural, saphenous, tibial, superficial, and deep peroneal motor and sensory distributions intact  Sensation light touch intact distally        _____________________________________________________  STUDIES REVIEWED:  X-Ray of right, left knee obtained on 7/11/2025 were reviewed and demonstrate moderate to significant degenerative disease with notable narrowing of the lateral joint space right and medial joint space left more predominantly than the opposite compartment.  There are osteophytes noted as well as subchondral sclerosis.  There are patellofemoral changes noted as well..      PROCEDURES:  Large joint arthrocentesis: bilateral knee    Performed by: See Carbajal DO  Authorized by: See Carbajal DO    Universal Protocol:  Consent: Verbal consent obtained  Consent given by: patient  Patient understanding: " patient states understanding of the procedure being performed  Supporting Documentation  Indications: pain     Is this a Visco injection? NoProcedure Details  Location: knee - bilateral knee  Needle size: 22 G  Ultrasound guidance: no  Approach: lateral    Medications (Right): 4 mL bupivacaine 0.25 %; 40 mg triamcinolone acetonide 40 mg/mLMedications (Left): 4 mL bupivacaine 0.25 %; 40 mg triamcinolone acetonide 40 mg/mL             Scribe Attestation      I,:  Mikayla Laguna am acting as a scribe while in the presence of the attending physician.:       I,:  See Carbajal DO personally performed the services described in this documentation    as scribed in my presence.:                  [1]   Past Medical History:  Diagnosis Date    Disease of thyroid gland     GERD (gastroesophageal reflux disease)     Hypertension    [2]   Past Surgical History:  Procedure Laterality Date    HYSTERECTOMY      REDUCTION MAMMAPLASTY Bilateral     KAREEM-EN-Y PROCEDURE      open bypass and jeff Dr. Waite    TONSILLECTOMY     [3]   Family History  Problem Relation Name Age of Onset    Hypertension Mother      Heart disease Mother      COPD Mother      Deep vein thrombosis Mother      Pancreatic cancer Maternal Grandfather      Pancreatic cancer Maternal Aunt      Colon cancer Maternal Uncle     [4]   Social History  Tobacco Use    Smoking status: Never    Smokeless tobacco: Never   Vaping Use    Vaping status: Never Used   Substance Use Topics    Alcohol use: Not Currently    Drug use: Never   [5]   Current Outpatient Medications:     Biotin 1 MG CAPS, Take by mouth, Disp: , Rfl:     famotidine (PEPCID) 40 MG tablet, TAKE 1 TABLET BY MOUTH EVERY DAY, Disp: 90 tablet, Rfl: 1    HYDROcodone-acetaminophen (NORCO) 5-325 mg per tablet, Take 1 tablet by mouth in the morning and 1 tablet in the evening., Disp: , Rfl:     montelukast (SINGULAIR) 10 mg tablet, Take 10 mg by mouth daily, Disp: , Rfl:     ondansetron (ZOFRAN-ODT) 4 mg  "disintegrating tablet, Take 1 tablet by mouth every 8 (eight) hours as needed, Disp: , Rfl:     sertraline (ZOLOFT) 50 mg tablet, Take 50 mg by mouth in the morning., Disp: , Rfl:     valsartan (DIOVAN) 160 mg tablet, Take 160 mg by mouth daily, Disp: , Rfl:     cholecalciferol (VITAMIN D3) 400 units tablet, Take 400 Units by mouth in the morning. (Patient not taking: Reported on 7/11/2025), Disp: , Rfl:     cyanocobalamin (VITAMIN B-12) 500 MCG tablet, Take 500 mcg by mouth daily (Patient not taking: Reported on 12/11/2024), Disp: , Rfl:     ergocalciferol (ERGOCALCIFEROL) 1.25 MG (05269 UT) capsule, Take 50,000 Units by mouth once a week (Patient not taking: Reported on 7/11/2025), Disp: , Rfl:     levothyroxine 75 mcg tablet, Take 75 mcg by mouth in the morning. Unsure of the correct dosage., Disp: , Rfl:     methenamine hippurate (HIPREX) 1 g tablet, in the morning and before bedtime. with meals for 7 days., Disp: , Rfl:     sucralfate (CARAFATE) 1 g tablet, , Disp: , Rfl:     valsartan 160 mg TABS 160 mg, hydrochlorothiazide 12.5 mg TABS 12.5 mg, Take by mouth in the morning. (Patient not taking: Reported on 7/11/2025), Disp: , Rfl:   [6]   Allergies  Allergen Reactions    Diclofenac Hives    Diclofenac Sodium Hives    Piroxicam Hives    Sulfamethoxazole-Trimethoprim Hives and Swelling    Prednisone Hives and GI Intolerance     \"sweling\" in face  Redness of face   Swelling cheeks,face red       "

## 2025-07-30 ENCOUNTER — OFFICE VISIT (OUTPATIENT)
Dept: OBGYN CLINIC | Facility: CLINIC | Age: 80
End: 2025-07-30
Payer: MEDICARE

## 2025-07-30 VITALS — HEIGHT: 63 IN | BODY MASS INDEX: 30.83 KG/M2 | WEIGHT: 174 LBS

## 2025-07-30 DIAGNOSIS — M17.0 PRIMARY OSTEOARTHRITIS OF BOTH KNEES: Primary | ICD-10-CM

## 2025-07-30 PROCEDURE — 99213 OFFICE O/P EST LOW 20 MIN: CPT | Performed by: ORTHOPAEDIC SURGERY
